# Patient Record
Sex: FEMALE | Race: WHITE | NOT HISPANIC OR LATINO | Employment: OTHER | URBAN - METROPOLITAN AREA
[De-identification: names, ages, dates, MRNs, and addresses within clinical notes are randomized per-mention and may not be internally consistent; named-entity substitution may affect disease eponyms.]

---

## 2017-08-08 PROBLEM — M48.07 LUMBOSACRAL STENOSIS: Status: ACTIVE | Noted: 2017-08-08

## 2017-08-08 PROBLEM — M54.16 LUMBAR RADICULOPATHY: Status: ACTIVE | Noted: 2017-08-08

## 2017-08-08 PROBLEM — G57.00 PIRIFORMIS SYNDROME: Status: ACTIVE | Noted: 2017-08-08

## 2017-10-03 PROBLEM — M48.062 NEUROGENIC CLAUDICATION DUE TO LUMBAR SPINAL STENOSIS: Status: ACTIVE | Noted: 2017-10-03

## 2017-10-05 PROBLEM — M43.17 ACQUIRED SPONDYLOLISTHESIS OF LUMBOSACRAL REGION: Status: ACTIVE | Noted: 2017-10-05

## 2017-10-05 PROBLEM — M51.26 HNP (HERNIATED NUCLEUS PULPOSUS), LUMBAR: Status: ACTIVE | Noted: 2017-10-05

## 2018-01-04 PROBLEM — I80.01 THROMBOPHLEBITIS OF SUPERFICIAL VEINS OF RIGHT LOWER EXTREMITY: Status: ACTIVE | Noted: 2018-01-04

## 2023-05-10 ENCOUNTER — OFFICE VISIT (OUTPATIENT)
Dept: PRIMARY CARE | Facility: CLINIC | Age: 83
End: 2023-05-10
Payer: MEDICARE

## 2023-05-10 VITALS
RESPIRATION RATE: 18 BRPM | DIASTOLIC BLOOD PRESSURE: 68 MMHG | WEIGHT: 131 LBS | SYSTOLIC BLOOD PRESSURE: 127 MMHG | HEART RATE: 75 BPM | BODY MASS INDEX: 25.58 KG/M2 | TEMPERATURE: 98 F | OXYGEN SATURATION: 98 %

## 2023-05-10 DIAGNOSIS — Z78.0 ASYMPTOMATIC MENOPAUSAL STATE: ICD-10-CM

## 2023-05-10 DIAGNOSIS — M81.0 OSTEOPOROSIS WITHOUT CURRENT PATHOLOGICAL FRACTURE, UNSPECIFIED OSTEOPOROSIS TYPE: ICD-10-CM

## 2023-05-10 DIAGNOSIS — M48.07 LUMBOSACRAL STENOSIS: ICD-10-CM

## 2023-05-10 DIAGNOSIS — G25.0 ESSENTIAL TREMOR: ICD-10-CM

## 2023-05-10 DIAGNOSIS — M43.17 ACQUIRED SPONDYLOLISTHESIS OF LUMBOSACRAL REGION: ICD-10-CM

## 2023-05-10 DIAGNOSIS — Z23 NEED FOR VACCINATION: ICD-10-CM

## 2023-05-10 DIAGNOSIS — E78.2 MODERATE MIXED HYPERLIPIDEMIA NOT REQUIRING STATIN THERAPY: ICD-10-CM

## 2023-05-10 DIAGNOSIS — M54.16 LUMBAR RADICULOPATHY: ICD-10-CM

## 2023-05-10 DIAGNOSIS — M48.062 NEUROGENIC CLAUDICATION DUE TO LUMBAR SPINAL STENOSIS: ICD-10-CM

## 2023-05-10 DIAGNOSIS — Z86.718 H/O DEEP VENOUS THROMBOSIS: ICD-10-CM

## 2023-05-10 DIAGNOSIS — Z00.00 ROUTINE GENERAL MEDICAL EXAMINATION AT HEALTH CARE FACILITY: Primary | ICD-10-CM

## 2023-05-10 DIAGNOSIS — Z13.1 DIABETES MELLITUS SCREENING: ICD-10-CM

## 2023-05-10 DIAGNOSIS — R73.09 ELEVATED RANDOM BLOOD GLUCOSE LEVEL: ICD-10-CM

## 2023-05-10 DIAGNOSIS — K57.90 DIVERTICULOSIS: ICD-10-CM

## 2023-05-10 DIAGNOSIS — K21.9 GASTROESOPHAGEAL REFLUX DISEASE WITHOUT ESOPHAGITIS: ICD-10-CM

## 2023-05-10 DIAGNOSIS — Z00.00 MEDICARE ANNUAL WELLNESS VISIT, SUBSEQUENT: ICD-10-CM

## 2023-05-10 PROBLEM — M51.26 HNP (HERNIATED NUCLEUS PULPOSUS), LUMBAR: Status: ACTIVE | Noted: 2017-10-05

## 2023-05-10 PROBLEM — H26.9 CATARACTS, BOTH EYES: Status: ACTIVE | Noted: 2023-05-10

## 2023-05-10 PROBLEM — M41.9 SCOLIOSIS: Status: ACTIVE | Noted: 2023-05-10

## 2023-05-10 PROBLEM — M19.90 OA (OSTEOARTHRITIS): Status: ACTIVE | Noted: 2023-05-10

## 2023-05-10 PROBLEM — I80.01 THROMBOPHLEBITIS OF SUPERFICIAL VEINS OF RIGHT LOWER EXTREMITY: Status: RESOLVED | Noted: 2018-01-04 | Resolved: 2023-05-10

## 2023-05-10 PROBLEM — E78.5 HYPERLIPIDEMIA: Status: ACTIVE | Noted: 2023-05-10

## 2023-05-10 PROBLEM — G57.00 PIRIFORMIS SYNDROME: Status: ACTIVE | Noted: 2017-08-08

## 2023-05-10 PROBLEM — I80.9 THROMBOPHLEBITIS: Status: ACTIVE | Noted: 2023-05-10

## 2023-05-10 PROBLEM — R33.9 URINARY RETENTION: Status: ACTIVE | Noted: 2023-05-10

## 2023-05-10 PROCEDURE — 90677 PCV20 VACCINE IM: CPT | Performed by: NURSE PRACTITIONER

## 2023-05-10 PROCEDURE — 1160F RVW MEDS BY RX/DR IN RCRD: CPT | Performed by: NURSE PRACTITIONER

## 2023-05-10 PROCEDURE — 1157F ADVNC CARE PLAN IN RCRD: CPT | Performed by: NURSE PRACTITIONER

## 2023-05-10 PROCEDURE — G0009 ADMIN PNEUMOCOCCAL VACCINE: HCPCS | Performed by: NURSE PRACTITIONER

## 2023-05-10 PROCEDURE — G0438 PPPS, INITIAL VISIT: HCPCS | Performed by: NURSE PRACTITIONER

## 2023-05-10 PROCEDURE — 1036F TOBACCO NON-USER: CPT | Performed by: NURSE PRACTITIONER

## 2023-05-10 PROCEDURE — 1159F MED LIST DOCD IN RCRD: CPT | Performed by: NURSE PRACTITIONER

## 2023-05-10 PROCEDURE — 1170F FXNL STATUS ASSESSED: CPT | Performed by: NURSE PRACTITIONER

## 2023-05-10 RX ORDER — MULTIVIT-MIN/FA/LYCOPEN/LUTEIN .4-300-25
TABLET ORAL
COMMUNITY
Start: 2016-09-08

## 2023-05-10 RX ORDER — APIXABAN 5 MG/1
5 TABLET, FILM COATED ORAL 2 TIMES DAILY
COMMUNITY
End: 2023-08-28 | Stop reason: SDUPTHER

## 2023-05-10 ASSESSMENT — PATIENT HEALTH QUESTIONNAIRE - PHQ9
SUM OF ALL RESPONSES TO PHQ9 QUESTIONS 1 AND 2: 0
1. LITTLE INTEREST OR PLEASURE IN DOING THINGS: NOT AT ALL
2. FEELING DOWN, DEPRESSED OR HOPELESS: NOT AT ALL

## 2023-05-10 ASSESSMENT — ENCOUNTER SYMPTOMS
LOSS OF SENSATION IN FEET: 0
ABDOMINAL PAIN: 0
WEAKNESS: 0
SINUS PRESSURE: 0
COUGH: 0
PALPITATIONS: 0
OCCASIONAL FEELINGS OF UNSTEADINESS: 0
BACK PAIN: 1
HEADACHES: 0
FEVER: 0
DIARRHEA: 0
TROUBLE SWALLOWING: 0
FATIGUE: 0
CONSTIPATION: 0
TREMORS: 1
SHORTNESS OF BREATH: 0
DEPRESSION: 0

## 2023-05-10 ASSESSMENT — ACTIVITIES OF DAILY LIVING (ADL)
GROCERY_SHOPPING: INDEPENDENT
DRESSING: INDEPENDENT
BATHING: INDEPENDENT
TAKING_MEDICATION: INDEPENDENT
DOING_HOUSEWORK: INDEPENDENT
MANAGING_FINANCES: INDEPENDENT

## 2023-05-10 NOTE — PATIENT INSTRUCTIONS
Please schedule your bone density (DEXA scan).   Your lab work should be drawn on an empty stomach.  I will notify you once results of the above testing are received.  You no longer require colonoscopy screening.  You will be due for your next mammogram after 9/28/2024.  Please follow-up with your dentist at least every 6-12 months.  Please continue annual eye exams with your ophthalmologist.  Please return at least once every year for your Medicare Annual Wellness exam and as needed.

## 2023-05-10 NOTE — PROGRESS NOTES
Subjective   Reason for Visit: Amber Carrington is an 82 y.o. female here for a Medicare Wellness visit.          Reviewed all medications by prescribing practitioner or clinical pharmacist (such as prescriptions, OTCs, herbal therapies and supplements) and documented in the medical record.    HPI    Patient Care Team:  MACY Elam as PCP - General     Review of Systems    Objective   Vitals:  /68   Pulse 75   Temp 36.7 °C (98 °F)   Resp 18   Wt 59.4 kg (131 lb)   LMP  (LMP Unknown)   SpO2 98%   BMI 25.58 kg/m²       Physical Exam    Assessment/Plan   Problem List Items Addressed This Visit        Nervous    Essential tremor    Lumbar radiculopathy    Lumbosacral stenosis       Digestive    Diverticulosis    GERD (gastroesophageal reflux disease)       Musculoskeletal    Acquired spondylolisthesis of lumbosacral region    Neurogenic claudication due to lumbar spinal stenosis       Other    Hyperlipidemia   Other Visit Diagnoses     Routine general medical examination at health care facility    -  Primary    Medicare annual wellness visit, subsequent        Relevant Orders    CBC    Thyroid Stimulating Hormone    Lipid Panel    Hemoglobin A1C    H/O deep venous thrombosis        This was unprovoked in 1/2023. Recommend lifelong anticoagulation. Continue Eliquis.    Relevant Orders    CBC    Elevated random blood glucose level        Relevant Orders    Hemoglobin A1C    Diabetes mellitus screening        Relevant Orders    Hemoglobin A1C    Comprehensive Metabolic Panel    Need for vaccination        Relevant Orders    Pneumococcal conjugate vaccine 20-valent IM    Asymptomatic menopausal state        Relevant Orders    XR DEXA bone density

## 2023-05-10 NOTE — PROGRESS NOTES
Subjective   Reason for Visit: Amber Carrington is an 82 y.o. female here for a Medicare Wellness visit.          Reviewed all medications by prescribing practitioner or clinical pharmacist (such as prescriptions, OTCs, herbal therapies and supplements) and documented in the medical record.    HPI    Patient Self Assessment of Health Status  Patient Self Assessment: Good    Nutrition and Exercise  Current Diet: Well Balanced Diet  Adequate Fluid Intake: Yes  Caffeine: Yes  Exercise Frequency: Regularly         Home Safety Risk Factors: None    Patient Care Team:  BABATUNDE Elam-CNP as PCP - General     Review of Systems    Objective   Vitals:  /68   Pulse 75   Temp 36.7 °C (98 °F)   Resp 18   Wt 59.4 kg (131 lb)   LMP  (LMP Unknown)   SpO2 98%   BMI 25.58 kg/m²       Physical Exam    Assessment/Plan   Problem List Items Addressed This Visit    None

## 2023-05-10 NOTE — PROGRESS NOTES
Subjective   Reason for Visit: Amber Carrington is an 82 y.o. female here for a Medicare Wellness visit. The patient states she has never had a complete physical, and typically only sees a doctor if she is sick. She does have a h/o unprovoked LLE DVT 1/2023 and is on Eliquis. She also has h/o lumbar back surgery. She has no new complaints today.      HPI     Patient Self Assessment of Health Status  Patient Self Assessment: Good     Nutrition and Exercise  Current Diet: Well Balanced Diet  Adequate Fluid Intake: Yes  Caffeine: Yes  Exercise Frequency: Regularly        Home Safety Risk Factors: None  Reviewed all medications by prescribing practitioner or clinical pharmacist (such as prescriptions, OTCs, herbal therapies and supplements) and documented in the medical record.    Patient Care Team:  BABATUNDE Elam-CNP as PCP - General     Review of Systems   Constitutional:  Negative for fatigue and fever.   HENT:  Positive for dental problem. Negative for sinus pressure and trouble swallowing.    Eyes:  Negative for visual disturbance.   Respiratory:  Negative for cough and shortness of breath.    Cardiovascular:  Negative for chest pain and palpitations.   Gastrointestinal:  Negative for abdominal pain, constipation and diarrhea.   Musculoskeletal:  Positive for back pain.   Neurological:  Positive for tremors. Negative for weakness and headaches.       Objective   Vitals:  /68   Pulse 75   Temp 36.7 °C (98 °F)   Resp 18   Wt 59.4 kg (131 lb)   LMP  (LMP Unknown)   SpO2 98%   BMI 25.58 kg/m²       Physical Exam  Vitals reviewed.   Constitutional:       Appearance: Normal appearance.   HENT:      Head: Normocephalic.      Comments: Missing 2 teeth lower left. No discomfort or signs of infection.   Eyes:      Conjunctiva/sclera: Conjunctivae normal.   Cardiovascular:      Rate and Rhythm: Normal rate and regular rhythm.      Pulses: Normal pulses.   Pulmonary:      Effort: Pulmonary effort is normal.       Breath sounds: Normal breath sounds.   Abdominal:      General: Bowel sounds are normal.      Palpations: Abdomen is soft.   Musculoskeletal:      Cervical back: Neck supple.   Skin:     General: Skin is warm and dry.   Neurological:      General: No focal deficit present.      Mental Status: She is alert and oriented to person, place, and time.      Cranial Nerves: Cranial nerves 2-12 are intact.      Motor: Tremor present.      Gait: Gait is intact.      Comments: Tremors noted in head/neck, staggered speach   Psychiatric:         Mood and Affect: Mood normal.         Thought Content: Thought content normal.     Assessment/Plan   Problem List Items Addressed This Visit          Nervous    Essential tremor    Lumbar radiculopathy    Lumbosacral stenosis       Digestive    Diverticulosis    GERD (gastroesophageal reflux disease)       Musculoskeletal    Acquired spondylolisthesis of lumbosacral region    Neurogenic claudication due to lumbar spinal stenosis       Other    Hyperlipidemia     Other Visit Diagnoses       Routine general medical examination at health care facility    -  Primary    Medicare annual wellness visit, subsequent        Relevant Orders    CBC    Thyroid Stimulating Hormone    Lipid Panel    Hemoglobin A1C    H/O deep venous thrombosis        This was unprovoked in 1/2023. Recommend lifelong anticoagulation. Continue Eliquis.    Relevant Orders    CBC    Elevated random blood glucose level        Relevant Orders    Hemoglobin A1C    Diabetes mellitus screening        Relevant Orders    Hemoglobin A1C    Comprehensive Metabolic Panel    Need for vaccination        Relevant Orders    Pneumococcal conjugate vaccine 20-valent IM    Asymptomatic menopausal state        Relevant Orders    XR DEXA bone density          Please schedule your bone density (DEXA scan).   Your lab work should be drawn on an empty stomach.  I will notify you once results of the above testing are received.  You no  longer require colonoscopy screening.  You will be due for your next mammogram after 9/28/2024.  Please follow-up for annual flu vaccine next fall.   Please follow-up with your dentist at least every 6-12 months.  Please continue annual eye exams with your ophthalmologist.  Please return at least once every year for your Medicare Annual Wellness exam and as needed.

## 2023-05-12 ENCOUNTER — TELEPHONE (OUTPATIENT)
Dept: PRIMARY CARE | Facility: CLINIC | Age: 83
End: 2023-05-12

## 2023-05-12 ENCOUNTER — LAB (OUTPATIENT)
Dept: LAB | Facility: LAB | Age: 83
End: 2023-05-12
Payer: MEDICARE

## 2023-05-12 DIAGNOSIS — R73.09 ELEVATED RANDOM BLOOD GLUCOSE LEVEL: ICD-10-CM

## 2023-05-12 DIAGNOSIS — Z00.00 MEDICARE ANNUAL WELLNESS VISIT, SUBSEQUENT: ICD-10-CM

## 2023-05-12 DIAGNOSIS — Z86.718 H/O DEEP VENOUS THROMBOSIS: ICD-10-CM

## 2023-05-12 DIAGNOSIS — Z13.1 DIABETES MELLITUS SCREENING: ICD-10-CM

## 2023-05-12 LAB
ALANINE AMINOTRANSFERASE (SGPT) (U/L) IN SER/PLAS: 12 U/L (ref 7–45)
ALBUMIN (G/DL) IN SER/PLAS: 4.2 G/DL (ref 3.4–5)
ALKALINE PHOSPHATASE (U/L) IN SER/PLAS: 61 U/L (ref 33–136)
ANION GAP IN SER/PLAS: 11 MMOL/L (ref 10–20)
ASPARTATE AMINOTRANSFERASE (SGOT) (U/L) IN SER/PLAS: 14 U/L (ref 9–39)
BILIRUBIN TOTAL (MG/DL) IN SER/PLAS: 0.7 MG/DL (ref 0–1.2)
CALCIUM (MG/DL) IN SER/PLAS: 9.6 MG/DL (ref 8.6–10.3)
CARBON DIOXIDE, TOTAL (MMOL/L) IN SER/PLAS: 28 MMOL/L (ref 21–32)
CHLORIDE (MMOL/L) IN SER/PLAS: 107 MMOL/L (ref 98–107)
CHOLESTEROL (MG/DL) IN SER/PLAS: 202 MG/DL (ref 0–199)
CHOLESTEROL IN HDL (MG/DL) IN SER/PLAS: 49.5 MG/DL
CHOLESTEROL/HDL RATIO: 4.1
CREATININE (MG/DL) IN SER/PLAS: 0.82 MG/DL (ref 0.5–1.05)
ERYTHROCYTE DISTRIBUTION WIDTH (RATIO) BY AUTOMATED COUNT: 13.8 % (ref 11.5–14.5)
ERYTHROCYTE MEAN CORPUSCULAR HEMOGLOBIN CONCENTRATION (G/DL) BY AUTOMATED: 32.5 G/DL (ref 32–36)
ERYTHROCYTE MEAN CORPUSCULAR VOLUME (FL) BY AUTOMATED COUNT: 91 FL (ref 80–100)
ERYTHROCYTES (10*6/UL) IN BLOOD BY AUTOMATED COUNT: 4.96 X10E12/L (ref 4–5.2)
ESTIMATED AVERAGE GLUCOSE FOR HBA1C: 111 MG/DL
GFR FEMALE: 70 ML/MIN/1.73M2
GLUCOSE (MG/DL) IN SER/PLAS: 84 MG/DL (ref 74–99)
HEMATOCRIT (%) IN BLOOD BY AUTOMATED COUNT: 45.3 % (ref 36–46)
HEMOGLOBIN (G/DL) IN BLOOD: 14.7 G/DL (ref 12–16)
HEMOGLOBIN A1C/HEMOGLOBIN TOTAL IN BLOOD: 5.5 %
LDL: 126 MG/DL (ref 0–99)
LEUKOCYTES (10*3/UL) IN BLOOD BY AUTOMATED COUNT: 6.3 X10E9/L (ref 4.4–11.3)
PLATELETS (10*3/UL) IN BLOOD AUTOMATED COUNT: 244 X10E9/L (ref 150–450)
POTASSIUM (MMOL/L) IN SER/PLAS: 4.4 MMOL/L (ref 3.5–5.3)
PROTEIN TOTAL: 6.7 G/DL (ref 6.4–8.2)
SODIUM (MMOL/L) IN SER/PLAS: 142 MMOL/L (ref 136–145)
THYROTROPIN (MIU/L) IN SER/PLAS BY DETECTION LIMIT <= 0.05 MIU/L: 2.33 MIU/L (ref 0.44–3.98)
TRIGLYCERIDE (MG/DL) IN SER/PLAS: 134 MG/DL (ref 0–149)
UREA NITROGEN (MG/DL) IN SER/PLAS: 15 MG/DL (ref 6–23)
VLDL: 27 MG/DL (ref 0–40)

## 2023-05-12 PROCEDURE — 36415 COLL VENOUS BLD VENIPUNCTURE: CPT

## 2023-05-12 PROCEDURE — 80053 COMPREHEN METABOLIC PANEL: CPT

## 2023-05-12 PROCEDURE — 85027 COMPLETE CBC AUTOMATED: CPT

## 2023-05-12 PROCEDURE — 80061 LIPID PANEL: CPT

## 2023-05-12 PROCEDURE — 83036 HEMOGLOBIN GLYCOSYLATED A1C: CPT

## 2023-05-12 PROCEDURE — 84443 ASSAY THYROID STIM HORMONE: CPT

## 2023-05-12 NOTE — RESULT ENCOUNTER NOTE
Please advise patient her labs were normal except for mildly elevated cholesterol. She does not need medication at this time, but should increase consumption of fruits and vegetables and decrease intake of fatty foods fast foods.

## 2023-05-12 NOTE — TELEPHONE ENCOUNTER
----- Message from MACY Elam sent at 5/12/2023  2:39 PM EDT -----  Please advise patient her labs were normal except for mildly elevated cholesterol. She does not need medication at this time, but should increase consumption of fruits and vegetables and decrease intake of fatty foods fast foods.

## 2023-06-01 ENCOUNTER — TELEPHONE (OUTPATIENT)
Dept: PRIMARY CARE | Facility: CLINIC | Age: 83
End: 2023-06-01
Payer: MEDICARE

## 2023-06-01 NOTE — TELEPHONE ENCOUNTER
----- Message from MACY Elam sent at 5/31/2023  3:10 PM EDT -----  Please advise patient that her bone density scan shows osteoporosis. This increases her risk for fractures both with and without a fall or other traumatic injury. I would like her to have a vitamin D level drawn. Once that is done, she will need to follow-up with me to discuss treatment options for her osteoporosis.

## 2023-08-02 NOTE — PROGRESS NOTES
Patient Name: Gunnar Lazcano : 1940        Date: 2023      Type of Appt: Consult    Reason for appt: VERBAL REFERRAL FROM DR. RIGGINS PER PT. LOW BACK PAIN. LUMBAR XRAY DONE AT [facility]. PT HAS DISC. Referred by: VERBAL REFERRAL FROM DR. RIGGINS PER PT    Pt last seen by Dr. Morteza Dumont on: 2018    Studies done: Xray L/S @ [facility]  -lmovm for pt to bring disc and report to appt. Surgeries: [facility] 2017 left L4-5 microdissection decompression interbody cage posterior lateral fusion pedicle screws with Dr. Darrick [facility]  Neurosurgery and Pain [facility]  [address], [address] [place] [address], [address] [place] [address] [place]: [phone]  F: [phone]          Patient:  Gunnar Lazcano  YOB: 1940  Date: 2023    The patient is a 78 y.o. female who presents today for evaluation of the following problems:     Chief Complaint   Patient presents with    Back Pain        Referred by No ref. provider found      PAST MEDICAL, FAMILY AND SOCIAL HISTORY:  Past Medical History:   Diagnosis Date    Allergic rhinitis     Diverticulosis 2009    Gastritis 2009    GERD (gastroesophageal reflux disease)     HNP (herniated nucleus pulposus)     L SPINE    Hyperlipidemia     OA (osteoarthritis)     Scoliosis      Past Surgical History:   Procedure Laterality Date    BREAST BIOPSY  2015    Mike TRAN M.D. CATARACT REMOVAL  2010    BOTH    COLONOSCOPY  09    DR BRUNER    FOOT SURGERY      HYSTERECTOMY (CERVIX STATUS UNKNOWN)  1979    LUMBAR [address] [facility] SURGERY  2008    DR. Mix    UPPER GASTROINTESTINAL ENDOSCOPY  09,2013    DR BRUNER    UPPER GASTROINTESTINAL ENDOSCOPY  2014     No family history on file.   Current Outpatient Medications on File Prior to Visit   Medication Sig Dispense Refill    Handicap Placard MISC by Does not apply route Six months 1 each 0    gabapentin (NEURONTIN) 100 MG

## 2023-08-08 ENCOUNTER — INITIAL CONSULT (OUTPATIENT)
Dept: NEUROSURGERY | Age: 83
End: 2023-08-08
Payer: MEDICARE

## 2023-08-08 VITALS
WEIGHT: 126.7 LBS | SYSTOLIC BLOOD PRESSURE: 116 MMHG | TEMPERATURE: 97.3 F | DIASTOLIC BLOOD PRESSURE: 62 MMHG | HEIGHT: 61 IN | BODY MASS INDEX: 23.92 KG/M2

## 2023-08-08 DIAGNOSIS — Z98.1 HISTORY OF LUMBAR FUSION: ICD-10-CM

## 2023-08-08 DIAGNOSIS — I70.219 ATHEROSCLEROTIC PERIPHERAL VASCULAR DISEASE WITH INTERMITTENT CLAUDICATION (HCC): ICD-10-CM

## 2023-08-08 DIAGNOSIS — I73.9 PERIPHERAL VASCULAR DISEASE, UNSPECIFIED (HCC): ICD-10-CM

## 2023-08-08 DIAGNOSIS — M48.062 LUMBAR STENOSIS WITH NEUROGENIC CLAUDICATION: Primary | ICD-10-CM

## 2023-08-08 PROCEDURE — G8427 DOCREV CUR MEDS BY ELIG CLIN: HCPCS | Performed by: NEUROLOGICAL SURGERY

## 2023-08-08 PROCEDURE — 99203 OFFICE O/P NEW LOW 30 MIN: CPT | Performed by: NEUROLOGICAL SURGERY

## 2023-08-08 PROCEDURE — G8420 CALC BMI NORM PARAMETERS: HCPCS | Performed by: NEUROLOGICAL SURGERY

## 2023-08-08 PROCEDURE — 93925 LOWER EXTREMITY STUDY: CPT | Performed by: NEUROLOGICAL SURGERY

## 2023-08-08 PROCEDURE — 1090F PRES/ABSN URINE INCON ASSESS: CPT | Performed by: NEUROLOGICAL SURGERY

## 2023-08-08 ASSESSMENT — ENCOUNTER SYMPTOMS
SHORTNESS OF BREATH: 0
NAUSEA: 0
EYE PAIN: 0
BACK PAIN: 1

## 2023-08-09 ENCOUNTER — TELEPHONE (OUTPATIENT)
Dept: NEUROSURGERY | Age: 83
End: 2023-08-09

## 2023-08-09 NOTE — TELEPHONE ENCOUNTER
DE Duplex Lo Extrem Art Bilron order faxed to [facility] ([phone], [phone]). They will call patient to schedule.     No precert required-Medicare

## 2023-08-28 ENCOUNTER — LAB (OUTPATIENT)
Dept: LAB | Facility: LAB | Age: 83
End: 2023-08-28
Payer: MEDICARE

## 2023-08-28 ENCOUNTER — TELEPHONE (OUTPATIENT)
Dept: PEDIATRICS | Facility: CLINIC | Age: 83
End: 2023-08-28

## 2023-08-28 DIAGNOSIS — Z86.718 H/O DEEP VENOUS THROMBOSIS: ICD-10-CM

## 2023-08-28 DIAGNOSIS — M81.0 OSTEOPOROSIS WITHOUT CURRENT PATHOLOGICAL FRACTURE, UNSPECIFIED OSTEOPOROSIS TYPE: ICD-10-CM

## 2023-08-28 LAB — CALCIDIOL (25 OH VITAMIN D3) (NG/ML) IN SER/PLAS: 47 NG/ML

## 2023-08-28 PROCEDURE — 82306 VITAMIN D 25 HYDROXY: CPT

## 2023-08-28 PROCEDURE — 36415 COLL VENOUS BLD VENIPUNCTURE: CPT

## 2023-08-28 RX ORDER — APIXABAN 5 MG/1
5 TABLET, FILM COATED ORAL 2 TIMES DAILY
Qty: 180 TABLET | Refills: 1 | Status: SHIPPED | OUTPATIENT
Start: 2023-08-28 | End: 2023-08-28 | Stop reason: SDUPTHER

## 2023-08-28 NOTE — TELEPHONE ENCOUNTER
Patient requests prescription below    Last Office Visit: 5/10/2023     Requested Prescriptions     Pending Prescriptions Disp Refills    Eliquis 5 mg tablet 180 tablet 1     Sig: Take 1 tablet (5 mg) by mouth 2 times a day.

## 2023-08-29 NOTE — PROGRESS NOTES
Patient Name: Juliana Ahmadi : 1940        Date: 2023      Type of Appt: Follow up     Reason for appt: Review Doppler ultrasound lower extremity for vascular supply x-ray MRI lumbar spine(@ [facility] recheck 30    Pt last seen by Dr. Tiara Mckeon on: 23    Studies done: Ultrasound of Lower Ext.  @ [facility]. Per pt   23- Recinos Dusoll for pt to bring studies on disc to appt    Surgeries: [facility] 2017 left L4-5 microdissection decompression interbody cage posterior lateral fusion pedicle screws with Dr. Tiara Mckeon     Smoking: No- Former     Review of Systems: All negatives     Chief Complaint   Patient presents with    Back Pain         Referred by No ref. provider found        PAST MEDICAL, FAMILY AND SOCIAL HISTORY:  Past Medical History        Past Medical History:   Diagnosis Date    Allergic rhinitis      Diverticulosis     Gastritis     GERD (gastroesophageal reflux disease)      HNP (herniated nucleus pulposus)       L SPINE    Hyperlipidemia      OA (osteoarthritis)      Scoliosis           Past Surgical History         Past Surgical History:   Procedure Laterality Date    BREAST BIOPSY   2015     Ying TRAN M.D. CATARACT REMOVAL        BOTH    COLONOSCOPY   09     DR BRUNER    FOOT SURGERY       HYSTERECTOMY (CERVIX STATUS UNKNOWN)   1979    LUMBAR [address] [facility] SURGERY   2008      [facility]    UPPER GASTROINTESTINAL ENDOSCOPY   09,2013     DR BRUNER    UPPER GASTROINTESTINAL ENDOSCOPY   2014         Family History   No family history on file.             Current Outpatient Medications on File Prior to Visit   Medication Sig Dispense Refill    Handicap Placard MISC by Does not apply route Six months 1 each 0    gabapentin (NEURONTIN) 100 MG capsule Take 1 capsule by mouth every evening 30 capsule 0    omeprazole (PRILOSEC) 40 MG delayed release capsule TK ONE C PO  D   13    oxyCODONE-acetaminophen (PERCOCET) 5-325 MG per tablet TK 1 T PO Q 4 H AS

## 2023-09-05 ENCOUNTER — OFFICE VISIT (OUTPATIENT)
Dept: NEUROSURGERY | Age: 83
End: 2023-09-05
Payer: MEDICARE

## 2023-09-05 VITALS — HEIGHT: 61 IN | WEIGHT: 126 LBS | BODY MASS INDEX: 23.79 KG/M2

## 2023-09-05 DIAGNOSIS — Z98.1 HISTORY OF LUMBAR FUSION: ICD-10-CM

## 2023-09-05 DIAGNOSIS — M76.72 PERONEAL TENDINITIS OF LEFT LOWER LEG: ICD-10-CM

## 2023-09-05 DIAGNOSIS — M48.062 LUMBAR STENOSIS WITH NEUROGENIC CLAUDICATION: Primary | ICD-10-CM

## 2023-09-05 DIAGNOSIS — M47.816 LUMBAR SPONDYLOSIS: ICD-10-CM

## 2023-09-05 PROCEDURE — G8400 PT W/DXA NO RESULTS DOC: HCPCS | Performed by: NEUROLOGICAL SURGERY

## 2023-09-05 PROCEDURE — G8428 CUR MEDS NOT DOCUMENT: HCPCS | Performed by: NEUROLOGICAL SURGERY

## 2023-09-05 PROCEDURE — 1090F PRES/ABSN URINE INCON ASSESS: CPT | Performed by: NEUROLOGICAL SURGERY

## 2023-09-05 PROCEDURE — G8420 CALC BMI NORM PARAMETERS: HCPCS | Performed by: NEUROLOGICAL SURGERY

## 2023-09-05 PROCEDURE — 99213 OFFICE O/P EST LOW 20 MIN: CPT | Performed by: NEUROLOGICAL SURGERY

## 2023-09-05 PROCEDURE — 1036F TOBACCO NON-USER: CPT | Performed by: NEUROLOGICAL SURGERY

## 2023-09-05 PROCEDURE — 1123F ACP DISCUSS/DSCN MKR DOCD: CPT | Performed by: NEUROLOGICAL SURGERY

## 2023-09-07 NOTE — PROGRESS NOTES
Patient Name: Gunnar Lazcano : 1940        Date: 2023      Type of Appt: Follow up    Reason for appt: Return for after studies completed, 30 min. [facility] 9/10)    Pt last seen by Dr. Morteza Dumont on: 23    Studies done: 23- MRI L/S @ [facility]  23- Xray L/S @ [facility] per pt ? ? Report? ?    23- pt stated that she had both MRI & Xray done at [facility] FOR JOINT DISEASES. I called [facility] to have images pushed to [facility]. @ 4:20pm    Surgeries:  [facility] 2017 left L4-5 microdissection decompression interbody cage posterior lateral fusion pedicle screws with Dr. Morteza Dumont     Smoking: No- Former    REVIEW OF SYSTEMS: All Negatives      Chief Complaint   Patient presents with    Back Pain         Referred by No ref. provider found        PAST MEDICAL, FAMILY AND SOCIAL HISTORY:  Past Medical History           Past Medical History:   Diagnosis Date    Allergic rhinitis      Diverticulosis 2009    Gastritis 2009    GERD (gastroesophageal reflux disease)      HNP (herniated nucleus pulposus)       L SPINE    Hyperlipidemia      OA (osteoarthritis)      Scoliosis           Past Surgical History             Past Surgical History:   Procedure Laterality Date    BREAST BIOPSY   2015     Mike TRAN M.D. CATARACT REMOVAL   2010     BOTH    COLONOSCOPY   09     DR BRUNER    FOOT SURGERY       HYSTERECTOMY (CERVIX STATUS UNKNOWN)   1979    LUMBAR [facility] SURGERY   2008      [facility]    UPPER GASTROINTESTINAL ENDOSCOPY   09,2013     DR BRUNER    UPPER GASTROINTESTINAL ENDOSCOPY   2014         Family History   No family history on file.                   Current Outpatient Medications on File Prior to Visit   Medication Sig Dispense Refill    Handicap Placard MISC by Does not apply route Six months 1 each 0    gabapentin (NEURONTIN) 100 MG capsule Take 1 capsule by mouth every evening 30 capsule 0    omeprazole (PRILOSEC) 40 MG delayed release capsule TK ONE C PO  D   13

## 2023-09-19 ENCOUNTER — OFFICE VISIT (OUTPATIENT)
Dept: NEUROSURGERY | Age: 83
End: 2023-09-19
Payer: MEDICARE

## 2023-09-19 ENCOUNTER — PREP FOR PROCEDURE (OUTPATIENT)
Dept: NEUROSURGERY | Age: 83
End: 2023-09-19

## 2023-09-19 VITALS
BODY MASS INDEX: 23.79 KG/M2 | WEIGHT: 126 LBS | TEMPERATURE: 97.3 F | DIASTOLIC BLOOD PRESSURE: 70 MMHG | SYSTOLIC BLOOD PRESSURE: 122 MMHG | HEIGHT: 61 IN

## 2023-09-19 DIAGNOSIS — M48.062 LUMBAR STENOSIS WITH NEUROGENIC CLAUDICATION: Primary | ICD-10-CM

## 2023-09-19 DIAGNOSIS — Z98.1 HISTORY OF LUMBAR FUSION: ICD-10-CM

## 2023-09-19 PROCEDURE — G8400 PT W/DXA NO RESULTS DOC: HCPCS | Performed by: NEUROLOGICAL SURGERY

## 2023-09-19 PROCEDURE — G8427 DOCREV CUR MEDS BY ELIG CLIN: HCPCS | Performed by: NEUROLOGICAL SURGERY

## 2023-09-19 PROCEDURE — 1123F ACP DISCUSS/DSCN MKR DOCD: CPT | Performed by: NEUROLOGICAL SURGERY

## 2023-09-19 PROCEDURE — 1036F TOBACCO NON-USER: CPT | Performed by: NEUROLOGICAL SURGERY

## 2023-09-19 PROCEDURE — G8420 CALC BMI NORM PARAMETERS: HCPCS | Performed by: NEUROLOGICAL SURGERY

## 2023-09-19 PROCEDURE — 99213 OFFICE O/P EST LOW 20 MIN: CPT | Performed by: NEUROLOGICAL SURGERY

## 2023-09-19 PROCEDURE — 1090F PRES/ABSN URINE INCON ASSESS: CPT | Performed by: NEUROLOGICAL SURGERY

## 2023-09-19 RX ORDER — SODIUM CHLORIDE 0.9 % (FLUSH) 0.9 %
5-40 SYRINGE (ML) INJECTION EVERY 12 HOURS SCHEDULED
Status: CANCELLED | OUTPATIENT
Start: 2023-09-29

## 2023-09-19 RX ORDER — ACETAMINOPHEN 500 MG
1000 TABLET ORAL ONCE
Status: CANCELLED | OUTPATIENT
Start: 2023-09-29 | End: 2023-09-19

## 2023-09-19 RX ORDER — DEXAMETHASONE SODIUM PHOSPHATE 4 MG/ML
8 INJECTION, SOLUTION INTRA-ARTICULAR; INTRALESIONAL; INTRAMUSCULAR; INTRAVENOUS; SOFT TISSUE ONCE
Status: CANCELLED | OUTPATIENT
Start: 2023-09-29 | End: 2023-09-19

## 2023-09-19 RX ORDER — KETOROLAC TROMETHAMINE 15 MG/ML
15 INJECTION, SOLUTION INTRAMUSCULAR; INTRAVENOUS ONCE
Status: CANCELLED | OUTPATIENT
Start: 2023-09-29 | End: 2023-09-19

## 2023-09-19 RX ORDER — SODIUM CHLORIDE 9 MG/ML
INJECTION, SOLUTION INTRAVENOUS PRN
Status: CANCELLED | OUTPATIENT
Start: 2023-09-29

## 2023-09-19 RX ORDER — SODIUM CHLORIDE 0.9 % (FLUSH) 0.9 %
5-40 SYRINGE (ML) INJECTION PRN
Status: CANCELLED | OUTPATIENT
Start: 2023-09-29

## 2023-09-29 ENCOUNTER — HOSPITAL ENCOUNTER (OUTPATIENT)
Dept: PREADMISSION TESTING | Age: 83
Discharge: HOME | End: 2023-10-03
Payer: MEDICARE

## 2023-09-29 VITALS
TEMPERATURE: 98.1 F | SYSTOLIC BLOOD PRESSURE: 140 MMHG | HEART RATE: 91 BPM | DIASTOLIC BLOOD PRESSURE: 67 MMHG | RESPIRATION RATE: 20 BRPM | OXYGEN SATURATION: 95 %

## 2023-09-29 PROBLEM — I80.9 THROMBOPHLEBITIS: Status: ACTIVE | Noted: 2023-05-10

## 2023-09-29 PROBLEM — G25.0 ESSENTIAL TREMOR: Status: ACTIVE | Noted: 2023-05-10

## 2023-09-29 LAB
ABO + RH BLD: NORMAL
APTT PPP: 34 SEC (ref 24.4–36.8)
BLD GP AB SCN SERPL QL: NORMAL
INR PPP: 1
PROTHROMBIN TIME: 13.5 SEC (ref 12.3–14.9)

## 2023-09-29 PROCEDURE — 85730 THROMBOPLASTIN TIME PARTIAL: CPT

## 2023-09-29 PROCEDURE — 86870 RBC ANTIBODY IDENTIFICATION: CPT

## 2023-09-29 PROCEDURE — 86850 RBC ANTIBODY SCREEN: CPT

## 2023-09-29 PROCEDURE — 87641 MR-STAPH DNA AMP PROBE: CPT

## 2023-09-29 PROCEDURE — 86900 BLOOD TYPING SEROLOGIC ABO: CPT

## 2023-09-29 PROCEDURE — 85610 PROTHROMBIN TIME: CPT

## 2023-09-29 PROCEDURE — 86901 BLOOD TYPING SEROLOGIC RH(D): CPT

## 2023-09-29 RX ORDER — APIXABAN 5 MG/1
5 TABLET, FILM COATED ORAL 2 TIMES DAILY
COMMUNITY
Start: 2023-08-28

## 2023-09-29 ASSESSMENT — ENCOUNTER SYMPTOMS
TROUBLE SWALLOWING: 0
FACIAL SWELLING: 0
EYE REDNESS: 0
CHOKING: 0
EYE DISCHARGE: 0
PHOTOPHOBIA: 0
SINUS PAIN: 0
SHORTNESS OF BREATH: 0
SINUS PRESSURE: 0
BACK PAIN: 1
SORE THROAT: 0
APNEA: 0
CHEST TIGHTNESS: 0
DIARRHEA: 0
NAUSEA: 0
RHINORRHEA: 0
EYE ITCHING: 0
VOMITING: 0
COUGH: 0
CONSTIPATION: 0
WHEEZING: 0
ABDOMINAL DISTENTION: 0
EYE PAIN: 0
ABDOMINAL PAIN: 0

## 2023-09-29 NOTE — H&P
PRE ADMISSION TESTING    HISTORY AND PHYSICAL EXAM    PATIENT NAME:  Haven Iniguez    YOB: 1940  MRN:  [id number]  SERVICE DATE:  9/29/2023     Primary Care Physician: Rodrigue Garland DO   Surgeon: Dr. Gamboa Current:  Lumbar stenosis with neurogenic claudication    The patient is a 78 y.o. female with significant past medical history of GERD, HLD, OA, diverticulosis, PVD w/intermittent claudication, h/o DVT (on Eliquis) who presents for a preoperative consultation at the request of surgeon, Dr. Gayla Dennis, who plans on performing Left bilateral Lumbar 3-4 minimally invasive microdecompression  on 10/4/23 at [facility] [place]. Patient has a hx of chronic back pain for multiple years and has had previous back surgeries performed by Dr. Shelby Sanon. Recently, her right leg started to become painful for no specific reason. She saw her orthopedic doctor who gave her an injection in her knee but it only provided minimal relief. At her follow up, her orthopedists reported that the issues could be from her back so he completed an x-ray and told her that she needed to see Dr. Shelby Sanon regarding her low back for possible surgical intervention. Dr. Shelby Sanon ordered u/s, x-rays and MRI. Dr. Shelby Sanon recommended surgical intervention d/t the severity of the stenosis which could cause her to lose ability to walk. Patient reports that all her pain is in her right knee mostly but can go low into her calf and is rated 2-3/10 on VAS. She will occasionally have some discomfort in her low back. Denies numbness or tingling. She has not found anything that will relieve the pain. She doesn't take any medications for the pain. Denies loss of bowel or bladder function.       Known Anesthesia Problems: Past general anesthesia with complications- last back surgery (4 hours surgery) she could not urinate or have a bowel movement for multiple days  Patient Objection to Receiving Blood Products: No  Personal of

## 2023-09-29 NOTE — H&P (VIEW-ONLY)
PRE ADMISSION TESTING    HISTORY AND PHYSICAL EXAM    PATIENT NAME:  Francisca Jones    YOB: 1940  MRN:  [id number]  SERVICE DATE:  9/29/2023     Primary Care Physician: Arnaldo Amaya DO   Surgeon: Dr. Ryley Nielsen:  Lumbar stenosis with neurogenic claudication    The patient is a 78 y.o. female with significant past medical history of GERD, HLD, OA, diverticulosis, PVD w/intermittent claudication, h/o DVT (on Eliquis) who presents for a preoperative consultation at the request of surgeon, Dr. Merari Orellana, who plans on performing Left bilateral Lumbar 3-4 minimally invasive microdecompression  on 10/4/23 at [facility] [place]. Patient has a hx of chronic back pain for multiple years and has had previous back surgeries performed by Dr. Adrien Buckley. Recently, her right leg started to become painful for no specific reason. She saw her orthopedic doctor who gave her an injection in her knee but it only provided minimal relief. At her follow up, her orthopedists reported that the issues could be from her back so he completed an x-ray and told her that she needed to see Dr. Adrien Buckley regarding her low back for possible surgical intervention. Dr. Adrien Buckley ordered u/s, x-rays and MRI. Dr. Adrien Buckley recommended surgical intervention d/t the severity of the stenosis which could cause her to lose ability to walk. Patient reports that all her pain is in her right knee mostly but can go low into her calf and is rated 2-3/10 on VAS. She will occasionally have some discomfort in her low back. Denies numbness or tingling. She has not found anything that will relieve the pain. She doesn't take any medications for the pain. Denies loss of bowel or bladder function.       Known Anesthesia Problems: Past general anesthesia with complications- last back surgery (4 hours surgery) she could not urinate or have a bowel movement for multiple days  Patient Objection to Receiving Blood Products: No  Personal of

## 2023-09-30 LAB
BLOOD GROUP ANTIBODIES SERPL: NORMAL
BLOOD GROUP ANTIBODIES SERPL: NORMAL
MRSA, DNA, NASAL: NEGATIVE
SPECIMEN DESCRIPTION: NORMAL

## 2023-10-03 ENCOUNTER — ANESTHESIA EVENT (OUTPATIENT)
Dept: OPERATING ROOM | Age: 83
End: 2023-10-03
Payer: MEDICARE

## 2023-10-03 LAB
BLOOD BANK DISPENSE STATUS: NORMAL
BLOOD BANK DISPENSE STATUS: NORMAL
BLOOD BANK PRODUCT CODE: NORMAL
BLOOD BANK PRODUCT CODE: NORMAL
BPU ID: NORMAL
BPU ID: NORMAL
DESCRIPTION BLOOD BANK: NORMAL
DESCRIPTION BLOOD BANK: NORMAL

## 2023-10-04 ENCOUNTER — HOSPITAL ENCOUNTER (INPATIENT)
Age: 83
LOS: 1 days | Discharge: HOME | DRG: 520 | End: 2023-10-06
Attending: NEUROLOGICAL SURGERY | Admitting: NEUROLOGICAL SURGERY
Payer: MEDICARE

## 2023-10-04 ENCOUNTER — ANESTHESIA (OUTPATIENT)
Dept: OPERATING ROOM | Age: 83
End: 2023-10-04
Payer: MEDICARE

## 2023-10-04 ENCOUNTER — APPOINTMENT (OUTPATIENT)
Dept: GENERAL RADIOLOGY | Age: 83
DRG: 520 | End: 2023-10-04
Attending: NEUROLOGICAL SURGERY
Payer: MEDICARE

## 2023-10-04 DIAGNOSIS — M48.062 NEUROGENIC CLAUDICATION DUE TO LUMBAR SPINAL STENOSIS: Primary | ICD-10-CM

## 2023-10-04 DIAGNOSIS — R52 PAIN: ICD-10-CM

## 2023-10-04 PROCEDURE — 01NB0ZZ RELEASE LUMBAR NERVE, OPEN APPROACH: ICD-10-PCS | Performed by: NEUROLOGICAL SURGERY

## 2023-10-04 PROCEDURE — 2500000003 HC RX 250 WO HCPCS: Performed by: NURSE ANESTHETIST, CERTIFIED REGISTERED

## 2023-10-04 PROCEDURE — 7100000001 HC PACU RECOVERY - ADDTL 15 MIN: Performed by: NEUROLOGICAL SURGERY

## 2023-10-04 PROCEDURE — 6360000002 HC RX W HCPCS: Performed by: NEUROLOGICAL SURGERY

## 2023-10-04 PROCEDURE — A4217 STERILE WATER/SALINE, 500 ML: HCPCS | Performed by: NEUROLOGICAL SURGERY

## 2023-10-04 PROCEDURE — 3700000000 HC ANESTHESIA ATTENDED CARE: Performed by: NEUROLOGICAL SURGERY

## 2023-10-04 PROCEDURE — 2580000003 HC RX 258: Performed by: NEUROLOGICAL SURGERY

## 2023-10-04 PROCEDURE — 6360000002 HC RX W HCPCS: Performed by: STUDENT IN AN ORGANIZED HEALTH CARE EDUCATION/TRAINING PROGRAM

## 2023-10-04 PROCEDURE — 2720000010 HC SURG SUPPLY STERILE: Performed by: NEUROLOGICAL SURGERY

## 2023-10-04 PROCEDURE — 6370000000 HC RX 637 (ALT 250 FOR IP): Performed by: NEUROLOGICAL SURGERY

## 2023-10-04 PROCEDURE — 6360000002 HC RX W HCPCS

## 2023-10-04 PROCEDURE — 2500000003 HC RX 250 WO HCPCS: Performed by: STUDENT IN AN ORGANIZED HEALTH CARE EDUCATION/TRAINING PROGRAM

## 2023-10-04 PROCEDURE — 3700000001 HC ADD 15 MINUTES (ANESTHESIA): Performed by: NEUROLOGICAL SURGERY

## 2023-10-04 PROCEDURE — G0378 HOSPITAL OBSERVATION PER HR: HCPCS

## 2023-10-04 PROCEDURE — 00NY0ZZ RELEASE LUMBAR SPINAL CORD, OPEN APPROACH: ICD-10-PCS | Performed by: NEUROLOGICAL SURGERY

## 2023-10-04 PROCEDURE — 2580000003 HC RX 258: Performed by: STUDENT IN AN ORGANIZED HEALTH CARE EDUCATION/TRAINING PROGRAM

## 2023-10-04 PROCEDURE — 3600000014 HC SURGERY LEVEL 4 ADDTL 15MIN: Performed by: NEUROLOGICAL SURGERY

## 2023-10-04 PROCEDURE — 7100000000 HC PACU RECOVERY - FIRST 15 MIN: Performed by: NEUROLOGICAL SURGERY

## 2023-10-04 PROCEDURE — 2709999900 HC NON-CHARGEABLE SUPPLY: Performed by: NEUROLOGICAL SURGERY

## 2023-10-04 PROCEDURE — 6360000002 HC RX W HCPCS: Performed by: NURSE ANESTHETIST, CERTIFIED REGISTERED

## 2023-10-04 PROCEDURE — 63047 LAM FACETEC & FORAMOT LUMBAR: CPT | Performed by: NEUROLOGICAL SURGERY

## 2023-10-04 PROCEDURE — 3600000004 HC SURGERY LEVEL 4 BASE: Performed by: NEUROLOGICAL SURGERY

## 2023-10-04 PROCEDURE — 2500000003 HC RX 250 WO HCPCS: Performed by: NEUROLOGICAL SURGERY

## 2023-10-04 PROCEDURE — C1713 ANCHOR/SCREW BN/BN,TIS/BN: HCPCS | Performed by: NEUROLOGICAL SURGERY

## 2023-10-04 RX ORDER — ONDANSETRON 2 MG/ML
4 INJECTION INTRAMUSCULAR; INTRAVENOUS EVERY 6 HOURS PRN
Status: DISCONTINUED | OUTPATIENT
Start: 2023-10-04 | End: 2023-10-06 | Stop reason: HOSPADM

## 2023-10-04 RX ORDER — ACETAMINOPHEN 325 MG/1
650 TABLET ORAL EVERY 6 HOURS
Status: DISCONTINUED | OUTPATIENT
Start: 2023-10-04 | End: 2023-10-06 | Stop reason: HOSPADM

## 2023-10-04 RX ORDER — SODIUM CHLORIDE 9 MG/ML
INJECTION, SOLUTION INTRAVENOUS PRN
Status: DISCONTINUED | OUTPATIENT
Start: 2023-10-04 | End: 2023-10-04 | Stop reason: HOSPADM

## 2023-10-04 RX ORDER — SODIUM CHLORIDE 9 MG/ML
INJECTION, SOLUTION INTRAVENOUS PRN
Status: DISCONTINUED | OUTPATIENT
Start: 2023-10-04 | End: 2023-10-06 | Stop reason: HOSPADM

## 2023-10-04 RX ORDER — DEXAMETHASONE SODIUM PHOSPHATE 4 MG/ML
8 INJECTION, SOLUTION INTRA-ARTICULAR; INTRALESIONAL; INTRAMUSCULAR; INTRAVENOUS; SOFT TISSUE ONCE
Status: COMPLETED | OUTPATIENT
Start: 2023-10-04 | End: 2023-10-04

## 2023-10-04 RX ORDER — KETOROLAC TROMETHAMINE 15 MG/ML
15 INJECTION, SOLUTION INTRAMUSCULAR; INTRAVENOUS ONCE
Status: DISCONTINUED | OUTPATIENT
Start: 2023-10-04 | End: 2023-10-04 | Stop reason: HOSPADM

## 2023-10-04 RX ORDER — FENTANYL CITRATE 0.05 MG/ML
25 INJECTION, SOLUTION INTRAMUSCULAR; INTRAVENOUS EVERY 5 MIN PRN
Status: DISCONTINUED | OUTPATIENT
Start: 2023-10-04 | End: 2023-10-04 | Stop reason: HOSPADM

## 2023-10-04 RX ORDER — SODIUM CHLORIDE 0.9 % (FLUSH) 0.9 %
5-40 SYRINGE (ML) INJECTION PRN
Status: DISCONTINUED | OUTPATIENT
Start: 2023-10-04 | End: 2023-10-04 | Stop reason: HOSPADM

## 2023-10-04 RX ORDER — SODIUM CHLORIDE 0.9 % (FLUSH) 0.9 %
5-40 SYRINGE (ML) INJECTION EVERY 12 HOURS SCHEDULED
Status: DISCONTINUED | OUTPATIENT
Start: 2023-10-04 | End: 2023-10-04 | Stop reason: HOSPADM

## 2023-10-04 RX ORDER — METOCLOPRAMIDE HYDROCHLORIDE 5 MG/ML
10 INJECTION INTRAMUSCULAR; INTRAVENOUS
Status: DISCONTINUED | OUTPATIENT
Start: 2023-10-04 | End: 2023-10-04 | Stop reason: HOSPADM

## 2023-10-04 RX ORDER — ONDANSETRON 2 MG/ML
4 INJECTION INTRAMUSCULAR; INTRAVENOUS
Status: DISCONTINUED | OUTPATIENT
Start: 2023-10-04 | End: 2023-10-04 | Stop reason: HOSPADM

## 2023-10-04 RX ORDER — OXYCODONE HYDROCHLORIDE 5 MG/1
5 TABLET ORAL EVERY 4 HOURS PRN
Status: DISCONTINUED | OUTPATIENT
Start: 2023-10-04 | End: 2023-10-06 | Stop reason: HOSPADM

## 2023-10-04 RX ORDER — FENTANYL CITRATE 50 UG/ML
INJECTION, SOLUTION INTRAMUSCULAR; INTRAVENOUS PRN
Status: DISCONTINUED | OUTPATIENT
Start: 2023-10-04 | End: 2023-10-04 | Stop reason: SDUPTHER

## 2023-10-04 RX ORDER — KETOROLAC TROMETHAMINE 30 MG/ML
INJECTION, SOLUTION INTRAMUSCULAR; INTRAVENOUS
Status: COMPLETED
Start: 2023-10-04 | End: 2023-10-04

## 2023-10-04 RX ORDER — SODIUM CHLORIDE 0.9 % (FLUSH) 0.9 %
5-40 SYRINGE (ML) INJECTION EVERY 12 HOURS SCHEDULED
Status: DISCONTINUED | OUTPATIENT
Start: 2023-10-04 | End: 2023-10-06 | Stop reason: HOSPADM

## 2023-10-04 RX ORDER — SODIUM CHLORIDE 0.9 % (FLUSH) 0.9 %
5-40 SYRINGE (ML) INJECTION PRN
Status: DISCONTINUED | OUTPATIENT
Start: 2023-10-04 | End: 2023-10-06 | Stop reason: HOSPADM

## 2023-10-04 RX ORDER — OXYCODONE HYDROCHLORIDE 5 MG/1
5 TABLET ORAL
Status: DISCONTINUED | OUTPATIENT
Start: 2023-10-04 | End: 2023-10-04 | Stop reason: HOSPADM

## 2023-10-04 RX ORDER — POLYETHYLENE GLYCOL 3350 17 G/17G
17 POWDER, FOR SOLUTION ORAL DAILY PRN
Status: DISCONTINUED | OUTPATIENT
Start: 2023-10-04 | End: 2023-10-06 | Stop reason: HOSPADM

## 2023-10-04 RX ORDER — DEXAMETHASONE SODIUM PHOSPHATE 4 MG/ML
INJECTION, SOLUTION INTRA-ARTICULAR; INTRALESIONAL; INTRAMUSCULAR; INTRAVENOUS; SOFT TISSUE PRN
Status: DISCONTINUED | OUTPATIENT
Start: 2023-10-04 | End: 2023-10-04 | Stop reason: SDUPTHER

## 2023-10-04 RX ORDER — DIPHENHYDRAMINE HYDROCHLORIDE 50 MG/ML
12.5 INJECTION INTRAMUSCULAR; INTRAVENOUS
Status: DISCONTINUED | OUTPATIENT
Start: 2023-10-04 | End: 2023-10-04 | Stop reason: HOSPADM

## 2023-10-04 RX ORDER — ACETAMINOPHEN 325 MG/1
650 TABLET ORAL
Status: DISCONTINUED | OUTPATIENT
Start: 2023-10-04 | End: 2023-10-04 | Stop reason: HOSPADM

## 2023-10-04 RX ORDER — MAGNESIUM HYDROXIDE 1200 MG/15ML
LIQUID ORAL CONTINUOUS PRN
Status: COMPLETED | OUTPATIENT
Start: 2023-10-04 | End: 2023-10-04

## 2023-10-04 RX ORDER — OXYCODONE HYDROCHLORIDE 5 MG/1
5 TABLET ORAL EVERY 6 HOURS PRN
Status: DISCONTINUED | OUTPATIENT
Start: 2023-10-04 | End: 2023-10-06 | Stop reason: HOSPADM

## 2023-10-04 RX ORDER — ONDANSETRON 2 MG/ML
INJECTION INTRAMUSCULAR; INTRAVENOUS PRN
Status: DISCONTINUED | OUTPATIENT
Start: 2023-10-04 | End: 2023-10-04 | Stop reason: SDUPTHER

## 2023-10-04 RX ORDER — SODIUM CHLORIDE 9 MG/ML
INJECTION, SOLUTION INTRAVENOUS CONTINUOUS
Status: DISCONTINUED | OUTPATIENT
Start: 2023-10-04 | End: 2023-10-06 | Stop reason: HOSPADM

## 2023-10-04 RX ORDER — PROPOFOL 10 MG/ML
INJECTION, EMULSION INTRAVENOUS PRN
Status: DISCONTINUED | OUTPATIENT
Start: 2023-10-04 | End: 2023-10-04 | Stop reason: SDUPTHER

## 2023-10-04 RX ORDER — LIDOCAINE HYDROCHLORIDE 20 MG/ML
INJECTION, SOLUTION INTRAVENOUS PRN
Status: DISCONTINUED | OUTPATIENT
Start: 2023-10-04 | End: 2023-10-04 | Stop reason: SDUPTHER

## 2023-10-04 RX ORDER — ACETAMINOPHEN 500 MG
1000 TABLET ORAL ONCE
Status: COMPLETED | OUTPATIENT
Start: 2023-10-04 | End: 2023-10-04

## 2023-10-04 RX ORDER — KETOROLAC TROMETHAMINE 15 MG/ML
15 INJECTION, SOLUTION INTRAMUSCULAR; INTRAVENOUS EVERY 6 HOURS
Status: DISCONTINUED | OUTPATIENT
Start: 2023-10-04 | End: 2023-10-06 | Stop reason: HOSPADM

## 2023-10-04 RX ORDER — SODIUM CHLORIDE 9 MG/ML
INJECTION, SOLUTION INTRAVENOUS CONTINUOUS
Status: DISCONTINUED | OUTPATIENT
Start: 2023-10-04 | End: 2023-10-04 | Stop reason: HOSPADM

## 2023-10-04 RX ORDER — LIDOCAINE HYDROCHLORIDE AND EPINEPHRINE 10; 10 MG/ML; UG/ML
INJECTION, SOLUTION INFILTRATION; PERINEURAL PRN
Status: DISCONTINUED | OUTPATIENT
Start: 2023-10-04 | End: 2023-10-04 | Stop reason: ALTCHOICE

## 2023-10-04 RX ORDER — ROCURONIUM BROMIDE 10 MG/ML
INJECTION, SOLUTION INTRAVENOUS PRN
Status: DISCONTINUED | OUTPATIENT
Start: 2023-10-04 | End: 2023-10-04 | Stop reason: SDUPTHER

## 2023-10-04 RX ORDER — BISACODYL 5 MG/1
5 TABLET, DELAYED RELEASE ORAL DAILY
Status: DISCONTINUED | OUTPATIENT
Start: 2023-10-04 | End: 2023-10-06 | Stop reason: HOSPADM

## 2023-10-04 RX ORDER — MIDAZOLAM HYDROCHLORIDE 1 MG/ML
INJECTION INTRAMUSCULAR; INTRAVENOUS PRN
Status: DISCONTINUED | OUTPATIENT
Start: 2023-10-04 | End: 2023-10-04 | Stop reason: SDUPTHER

## 2023-10-04 RX ADMIN — Medication 0.1 MG: at 13:41

## 2023-10-04 RX ADMIN — KETOROLAC TROMETHAMINE 15 MG: 30 INJECTION, SOLUTION INTRAMUSCULAR at 22:28

## 2023-10-04 RX ADMIN — FENTANYL CITRATE 25 MCG: 50 INJECTION, SOLUTION INTRAMUSCULAR; INTRAVENOUS at 15:05

## 2023-10-04 RX ADMIN — FENTANYL CITRATE 25 MCG: 50 INJECTION INTRAMUSCULAR; INTRAVENOUS at 15:45

## 2023-10-04 RX ADMIN — SODIUM CHLORIDE: 9 INJECTION, SOLUTION INTRAVENOUS at 10:22

## 2023-10-04 RX ADMIN — CEFAZOLIN 2000 MG: 2 INJECTION, POWDER, FOR SOLUTION INTRAMUSCULAR; INTRAVENOUS at 20:33

## 2023-10-04 RX ADMIN — Medication 0.1 MG: at 14:20

## 2023-10-04 RX ADMIN — ACETAMINOPHEN 650 MG: 325 TABLET ORAL at 20:16

## 2023-10-04 RX ADMIN — ROCURONIUM BROMIDE 20 MG: 10 INJECTION, SOLUTION INTRAVENOUS at 14:14

## 2023-10-04 RX ADMIN — BISACODYL 5 MG: 5 TABLET, COATED ORAL at 20:42

## 2023-10-04 RX ADMIN — ROCURONIUM BROMIDE 50 MG: 10 INJECTION, SOLUTION INTRAVENOUS at 12:46

## 2023-10-04 RX ADMIN — LIDOCAINE HYDROCHLORIDE 50 MG: 20 INJECTION, SOLUTION INTRAVENOUS at 12:46

## 2023-10-04 RX ADMIN — PROPOFOL 100 MG: 10 INJECTION, EMULSION INTRAVENOUS at 12:46

## 2023-10-04 RX ADMIN — Medication 0.1 MG: at 14:30

## 2023-10-04 RX ADMIN — SODIUM CHLORIDE, PRESERVATIVE FREE 10 ML: 5 INJECTION INTRAVENOUS at 20:17

## 2023-10-04 RX ADMIN — Medication 0.1 MG: at 14:11

## 2023-10-04 RX ADMIN — SUGAMMADEX 200 MG: 100 INJECTION, SOLUTION INTRAVENOUS at 15:15

## 2023-10-04 RX ADMIN — CEFAZOLIN 2000 MG: 2 INJECTION, POWDER, FOR SOLUTION INTRAMUSCULAR; INTRAVENOUS at 12:57

## 2023-10-04 RX ADMIN — ONDANSETRON 4 MG: 2 INJECTION INTRAMUSCULAR; INTRAVENOUS at 14:55

## 2023-10-04 RX ADMIN — Medication 0.1 MG: at 14:00

## 2023-10-04 RX ADMIN — Medication 0.1 MG: at 13:51

## 2023-10-04 RX ADMIN — DEXAMETHASONE SODIUM PHOSPHATE 4 MG: 4 INJECTION INTRA-ARTICULAR; INTRALESIONAL; INTRAMUSCULAR; INTRAVENOUS; SOFT TISSUE at 13:15

## 2023-10-04 RX ADMIN — FENTANYL CITRATE 25 MCG: 50 INJECTION, SOLUTION INTRAMUSCULAR; INTRAVENOUS at 13:41

## 2023-10-04 RX ADMIN — MIDAZOLAM HYDROCHLORIDE 2 MG: 1 INJECTION, SOLUTION INTRAMUSCULAR; INTRAVENOUS at 12:42

## 2023-10-04 RX ADMIN — ACETAMINOPHEN 1000 MG: 500 TABLET ORAL at 10:07

## 2023-10-04 RX ADMIN — ROCURONIUM BROMIDE 20 MG: 10 INJECTION, SOLUTION INTRAVENOUS at 13:31

## 2023-10-04 RX ADMIN — SODIUM CHLORIDE: 9 INJECTION, SOLUTION INTRAVENOUS at 20:28

## 2023-10-04 RX ADMIN — DEXAMETHASONE SODIUM PHOSPHATE 8 MG: 4 INJECTION INTRA-ARTICULAR; INTRALESIONAL; INTRAMUSCULAR; INTRAVENOUS; SOFT TISSUE at 10:22

## 2023-10-04 RX ADMIN — KETOROLAC TROMETHAMINE 15 MG: 30 INJECTION, SOLUTION INTRAMUSCULAR; INTRAVENOUS at 10:33

## 2023-10-04 ASSESSMENT — PAIN SCALES - GENERAL
PAINLEVEL_OUTOF10: 2
PAINLEVEL_OUTOF10: 3
PAINLEVEL_OUTOF10: 5
PAINLEVEL_OUTOF10: 3
PAINLEVEL_OUTOF10: 5
PAINLEVEL_OUTOF10: 3
PAINLEVEL_OUTOF10: 5

## 2023-10-04 ASSESSMENT — ENCOUNTER SYMPTOMS
NAUSEA: 0
SORE THROAT: 0
WHEEZING: 0
PHOTOPHOBIA: 0
RHINORRHEA: 0
EYES NEGATIVE: 1
ABDOMINAL PAIN: 0
SHORTNESS OF BREATH: 0
BACK PAIN: 0
ALLERGIC/IMMUNOLOGIC NEGATIVE: 1
VOMITING: 0

## 2023-10-04 ASSESSMENT — PAIN DESCRIPTION - DESCRIPTORS
DESCRIPTORS: SORE
DESCRIPTORS: BURNING
DESCRIPTORS: SORE

## 2023-10-04 ASSESSMENT — PAIN - FUNCTIONAL ASSESSMENT
PAIN_FUNCTIONAL_ASSESSMENT: 0-10
PAIN_FUNCTIONAL_ASSESSMENT: PREVENTS OR INTERFERES SOME ACTIVE ACTIVITIES AND ADLS

## 2023-10-04 ASSESSMENT — PAIN DESCRIPTION - ORIENTATION
ORIENTATION: LEFT;RIGHT
ORIENTATION: LEFT;RIGHT

## 2023-10-04 ASSESSMENT — PAIN DESCRIPTION - LOCATION
LOCATION: BACK

## 2023-10-04 NOTE — DISCHARGE INSTR - COC
Continuity of Care Form    Patient Name: Juliana Ahmadi   :  1940  MRN:  [id number]    Admit date:  10/4/2023  Discharge date:  ***    Code Status Order: Full Code   Advance Directives:   Advance Care Flowsheet Documentation       Date/Time Healthcare Directive Type of Healthcare Directive Copy in [address] Agent's Name Healthcare Agent's Phone Number    10/04/23 8629 Yes, patient has an advance directive for healthcare treatment Living will Yes, copy in chart Adult [facility] [phone]            Admitting Physician:  Mike Castillo MD  PCP: Niranjan Dsouza DO    Discharging Nurse: [facility] [address] Unit Phone Number: ***    Emergency Contact:   Extended Emergency Contact Information  Primary Emergency Contact: Nanci K[place] [address] [address] [place] Phone: [phone]  Relation: Child  Secondary Emergency Contact: Libra Tang [place] [address] [address] Phone: [phone]  Relation: None    Past Surgical History:  Past Surgical History:   Procedure Laterality Date    BACK SURGERY  2017    Spinal fusion    BREAST BIOPSY Right 2015    needle biopsy-right breast; Ying TRAN M.D. CATARACT REMOVAL  2010    BOTH    COLONOSCOPY  2009    DR ZOHREH CHOI'S RELEASE Right 2015    trigger finger    EYE SURGERY      Ptosis-right eyelid; blepharoplasty-left eyelid    FOOT SURGERY  1999    HYSTERECTOMY (CERVIX STATUS UNKNOWN)  1979    LUMBAR [facility] SURGERY  2008    lumbar microdiscectomy-DR. WILKINS    RHINOPLASTY  1986    UPPER GASTROINTESTINAL ENDOSCOPY  09,2013    DR BRUNER    UPPER GASTROINTESTINAL ENDOSCOPY  2014       Immunization History:   Immunization History   Administered Date(s) Administered    COVID-19, MODERNA Bivalent, (age 12y+), IM, 48 mcg/0.5 mL 10/12/2022       Active Problems:  Patient Active Problem List   Diagnosis Code

## 2023-10-04 NOTE — ANESTHESIA POSTPROCEDURE EVALUATION
Department of Anesthesiology  Postprocedure Note    Patient: Trever Aguila  MRN: [id number]  YOB: 1940  Date of evaluation: 10/4/2023      Procedure Summary     Date: 10/04/23 Room / Location: [address]    Anesthesia Start: 8774 Anesthesia Stop: 5696    Procedure: L3-4  MICRODECOMPRESSION (Spine Lumbar) Diagnosis:       Lumbar stenosis with neurogenic claudication      (Lumbar stenosis with neurogenic claudication [M48.062])    Surgeons: Reji Vargas MD Responsible Provider: Tenisha Bernal MD    Anesthesia Type: General ASA Status: 3          Anesthesia Type: General    Dana Phase I: Dana Score: 7    Dana Phase II:        Anesthesia Post Evaluation    Patient location during evaluation: bedside  Patient participation: complete - patient participated  Level of consciousness: awake and awake and alert  Airway patency: patent  Nausea & Vomiting: no nausea and no vomiting  Complications: no  Cardiovascular status: blood pressure returned to baseline and hemodynamically stable  Respiratory status: acceptable  Hydration status: euvolemic  Pain management: adequate

## 2023-10-04 NOTE — DISCHARGE INSTRUCTIONS
Every day after surgery change dressing frequently to keep wound clean and dry using 4X4 gauze pads and paper tape. May shower in 3 days postoperative. Do not soak or soap wound for one week post operative. Recheck one month. Questions call office [phone].

## 2023-10-04 NOTE — INTERVAL H&P NOTE
Update History & Physical    The patient's History and Physical of was reviewed with the patient and I examined the patient. There was no change. The surgical site was confirmed by the patient and me. Plan: The risks, benefits, expected outcome, and alternative to the recommended procedure have been discussed with the patient. Patient understands and wants to proceed with the procedure.      Electronically signed by Nilton Middleton MD on 10/4/2023 at 12:19 PM

## 2023-10-04 NOTE — BRIEF OP NOTE
Brief Postoperative Note      Patient: Suman Merrill  YOB: 1940  MRN: [id number]    Date of Procedure: 10/4/2023    Pre-Op Diagnosis Codes:     * Lumbar stenosis with neurogenic claudication [M48.062]    Post-Op Diagnosis: Same       Procedure(s):  L3-4  MICRODECOMPRESSION    Surgeon(s):  Cynthia Pinto MD    Assistant:  First Assistant: Freedom Little    Anesthesia: General    Estimated Blood Loss (mL): Minimal    Complications: None          Drains:   Closed/Suction Drain Inferior;Midline Back Accordion (Active)       Findings: severe canal stenosis      Electronically signed by Cynthia Pinto MD on 10/4/2023 at 3:01 PM

## 2023-10-04 NOTE — OP NOTE
[facility], [address] [facility]                                OPERATIVE REPORT    PATIENT NAME: Nilda Rg                      :        1940  MED REC NO:   [id number]                            ROOM:  ACCOUNT NO:   [de-identified]                           ADMIT DATE: 10/04/2023  PROVIDER:     Shantal Mcfadden MD    DATE OF PROCEDURE:  10/04/2023    PREOPERATIVE DIAGNOSIS:  L3-4 canal stenosis with neurogenic  claudication. POSTOPERATIVE DIAGNOSIS:  L3-4 canal stenosis with neurogenic  claudication. OPERATIONS PERFORMED:  Bilateral L3-4 laminectomies, microdissection,  and decompressions. SURGEON:  Shantal Mcfadden MD    DESCRIPTION OF PROCEDURE:  The patient was given general endotracheal  anesthesia in supine position. Turned to prone position. Back was  prepped and draped. Time-out, patient identified. Needle was placed. Lateral x-rays were obtained to confirm level. Needle was withdrawn. The patient had prior lumbar fusion with instrumentation at L4-5, S1.   Dissection was carried down the spinous process tips of L3-4. The  pedicle screw on the left L4 was identified. The pedicle screw on the  right side was more lateral and could be palpated. The self-retaining  retractor was placed. Using acting needles for markers and several  x-rays, I able to locate the L3-4 spinous processes and lamina. Beginning on the patient's right side, the spinous process of the  superior aspect of L4 and inferior aspect of L3 was removed. Using the  ligamentum flavum, a hemilaminectomy of the inferior aspect of L3 was  performed working superiorly not laterally. There is scarring at the  lamina of L4. With the use of microscope, very careful microdissection,  I was able to remove some of the scar from the superior aspect of lamina  of L4 and perform a laminectomy of the superior aspect of L4 working  inferiorly not laterally.   Because of scarring anatomy, I went to the  patient's left side. On the patient's left side, similarly the  laminectomy of the inferior aspect of L3, superior aspect of L4 was  performed. The hypertrophied ligamentum flavum was removed during a  medial facetectomy lateral to the pedicle of L4, staying medial to the  pedicle of L4 removing all of the pressure off the dural sac. I then  went back to the patient's right side and did the same by removing the  hypertrophied ligamentum flavum and performing a medial facetectomy  staying medial to the pedicle performing a foraminotomy for the exit of  the L4 nerve root around the L4 pedicle. The dural sac and nerve roots  were completely decompressed. The wound was thoroughly irrigated. The  wound drain was placed. Temporary use of Gelfoam, all removed. Surgiflo was used to control the bleeding. The dura was pulsating. The  retractors were all removed. The fascia was closed with 0 Vicryl  suture. Subcutaneous tissues were closed with 2-0, 3-0, and 4-0 Vicryl  sutures. EBL less than 20 mL. No blood transfused.         Jinny Figueroa MD    D: 10/04/2023 15:10:43       T: 10/04/2023 15:14:37     [email or web address]  Job#: [id number]     Doc#: [id number]    CC:

## 2023-10-05 PROBLEM — R33.8 ACUTE URINARY RETENTION: Status: ACTIVE | Noted: 2023-10-05

## 2023-10-05 PROBLEM — R33.9 URINARY RETENTION: Status: ACTIVE | Noted: 2023-10-05

## 2023-10-05 LAB — CREAT SERPL-MCNC: 0.88 MG/DL (ref 0.5–0.9)

## 2023-10-05 PROCEDURE — G0378 HOSPITAL OBSERVATION PER HR: HCPCS

## 2023-10-05 PROCEDURE — 2580000003 HC RX 258: Performed by: NEUROLOGICAL SURGERY

## 2023-10-05 PROCEDURE — 96374 THER/PROPH/DIAG INJ IV PUSH: CPT

## 2023-10-05 PROCEDURE — 6360000002 HC RX W HCPCS: Performed by: NEUROLOGICAL SURGERY

## 2023-10-05 PROCEDURE — 36415 COLL VENOUS BLD VENIPUNCTURE: CPT

## 2023-10-05 PROCEDURE — 6370000000 HC RX 637 (ALT 250 FOR IP): Performed by: NEUROLOGICAL SURGERY

## 2023-10-05 PROCEDURE — 2700000000 HC OXYGEN THERAPY PER DAY

## 2023-10-05 PROCEDURE — 96376 TX/PRO/DX INJ SAME DRUG ADON: CPT

## 2023-10-05 PROCEDURE — 96361 HYDRATE IV INFUSION ADD-ON: CPT

## 2023-10-05 PROCEDURE — 97161 PT EVAL LOW COMPLEX 20 MIN: CPT

## 2023-10-05 PROCEDURE — 99221 1ST HOSP IP/OBS SF/LOW 40: CPT | Performed by: PHYSICIAN ASSISTANT

## 2023-10-05 PROCEDURE — 97166 OT EVAL MOD COMPLEX 45 MIN: CPT

## 2023-10-05 PROCEDURE — 82565 ASSAY OF CREATININE: CPT

## 2023-10-05 PROCEDURE — 51701 INSERT BLADDER CATHETER: CPT

## 2023-10-05 PROCEDURE — 51798 US URINE CAPACITY MEASURE: CPT

## 2023-10-05 RX ADMIN — ACETAMINOPHEN 650 MG: 325 TABLET ORAL at 15:55

## 2023-10-05 RX ADMIN — SODIUM CHLORIDE: 9 INJECTION, SOLUTION INTRAVENOUS at 03:15

## 2023-10-05 RX ADMIN — ACETAMINOPHEN 650 MG: 325 TABLET ORAL at 21:14

## 2023-10-05 RX ADMIN — KETOROLAC TROMETHAMINE 15 MG: 30 INJECTION, SOLUTION INTRAMUSCULAR at 21:15

## 2023-10-05 RX ADMIN — SODIUM CHLORIDE, PRESERVATIVE FREE 10 ML: 5 INJECTION INTRAVENOUS at 21:15

## 2023-10-05 RX ADMIN — KETOROLAC TROMETHAMINE 15 MG: 30 INJECTION, SOLUTION INTRAMUSCULAR at 03:08

## 2023-10-05 RX ADMIN — CEFAZOLIN 2000 MG: 2 INJECTION, POWDER, FOR SOLUTION INTRAMUSCULAR; INTRAVENOUS at 05:00

## 2023-10-05 RX ADMIN — ACETAMINOPHEN 650 MG: 325 TABLET ORAL at 03:08

## 2023-10-05 RX ADMIN — KETOROLAC TROMETHAMINE 15 MG: 30 INJECTION, SOLUTION INTRAMUSCULAR at 13:44

## 2023-10-05 RX ADMIN — ACETAMINOPHEN 650 MG: 325 TABLET ORAL at 10:04

## 2023-10-05 RX ADMIN — KETOROLAC TROMETHAMINE 15 MG: 30 INJECTION, SOLUTION INTRAMUSCULAR at 09:28

## 2023-10-05 ASSESSMENT — PAIN DESCRIPTION - ORIENTATION
ORIENTATION: LOWER
ORIENTATION: RIGHT;LEFT

## 2023-10-05 ASSESSMENT — PAIN DESCRIPTION - LOCATION
LOCATION: BACK

## 2023-10-05 ASSESSMENT — PAIN SCALES - GENERAL
PAINLEVEL_OUTOF10: 3
PAINLEVEL_OUTOF10: 2
PAINLEVEL_OUTOF10: 0
PAINLEVEL_OUTOF10: 2
PAINLEVEL_OUTOF10: 3
PAINLEVEL_OUTOF10: 3
PAINLEVEL_OUTOF10: 2

## 2023-10-05 ASSESSMENT — ENCOUNTER SYMPTOMS: APNEA: 0

## 2023-10-05 ASSESSMENT — PAIN DESCRIPTION - DESCRIPTORS: DESCRIPTORS: ACHING

## 2023-10-05 NOTE — PROGRESS NOTES
[facility] OCCUPATIONAL THERAPY EVALUATION - ACUTE     NAME: Francisca Jones  : 1940 (78 y.o.)  MRN: [id number]  CODE STATUS: Full Code  Room: W280/W280-01    Date of Service: 10/5/2023    Patient Diagnosis(es): Lumbar stenosis with neurogenic claudication [M48.062]  Spinal stenosis, lumbar region, with neurogenic claudication [M48.062]   Patient Active Problem List    Diagnosis Date Noted    Urinary retention 10/05/2023    Pain 10/04/2023    Spinal stenosis, lumbar region, with neurogenic claudication 10/04/2023    Lumbar spondylosis 2023    Peroneal tendinitis of left lower leg 2023    Atherosclerotic peripheral vascular disease with intermittent claudication ([address]) 2023    History of lumbar fusion 2023    Lumbar stenosis with neurogenic claudication 2023    Thrombophlebitis 05/10/2023    Essential tremor 05/10/2023    Thrombophlebitis of superficial veins of right lower extremity 2018    Acquired spondylolisthesis of lumbosacral region 10/05/2017    HNP (herniated nucleus pulposus), lumbar 10/05/2017    Neurogenic claudication due to lumbar spinal stenosis 10/03/2017    Piriformis syndrome 2017    Lumbosacral stenosis 2017    Lumbar radiculopathy 2017    Allergic rhinitis 2012    GERD (gastroesophageal reflux disease)     Scoliosis     Hyperlipidemia     OA (osteoarthritis)     HNP (herniated nucleus pulposus)     Diverticulosis     Gastritis     Cataracts, both eyes         Past Medical History:   Diagnosis Date    Allergic rhinitis     Arthritis     Chronic back pain     Diverticulosis 2009    Gastritis 2009    GERD (gastroesophageal reflux disease)     GI problem     HNP (herniated nucleus pulposus)     L SPINE    Hx of blood clots 2023    DVT left leg    Hyperlipidemia     OA (osteoarthritis)     Retention of urine     last back surgery (4 hours surgery) she could not urinate or have a bowel movement for multiple days    Scoliosis Past Surgical History:   Procedure Laterality Date    BACK SURGERY  11/2017    Spinal fusion    BREAST BIOPSY Right 04/27/2015    needle biopsy-right breast; Eliot TRAN M.D. CATARACT REMOVAL  01/01/2010    BOTH    COLONOSCOPY  05/12/2009    DR ZOHREH CHOI'S RELEASE Right 01/2015    trigger finger    EYE SURGERY      Ptosis-right eyelid; blepharoplasty-left eyelid    FOOT SURGERY  01/01/1999    HYSTERECTOMY (CERVIX STATUS UNKNOWN)  01/01/1979    LAMINECTOMY N/A 10/4/2023    L3-4  MICRODECOMPRESSION performed by Lopez Soto MD at [address]  01/01/2008    lumbar microdiscectomy-DR. WILKINS    RHINOPLASTY  01/01/1986    UPPER GASTROINTESTINAL ENDOSCOPY  4/28/09,1/4/2013    DR BRUNER    UPPER GASTROINTESTINAL ENDOSCOPY  05/07/2014        Restrictions  Restrictions/Precautions: Up as Tolerated     Safety Devices: Safety Devices  Type of Devices: Bed alarm in place;Call light within reach; Left in bed     Patient's date of birth confirmed: Yes    General:  Chart Reviewed: Yes  Patient assessed for rehabilitation services?: Yes    Subjective  Subjective: \"I want to get home\"       Pain at start of treatment: Yes: 2/10    Pain at end of treatment: Yes: 2/10    Location: Low back    Description: Sore  Nursing notified: Declined  RN:   Intervention: Repositioned    Prior Level of Function:  Social/Functional History  Lives With: Alone  Type of Home: Condo  Home Layout: One level  Home Access: Level entry  Bathroom Shower/Tub: Walk-in shower  Bathroom Toilet: Handicap height  Bathroom Equipment: Shower chair  Home Equipment: None  Has the patient had two or more falls in the past year or any fall with injury in the past year?: No  ADL Assistance: [address] Independent  Homemaking Responsibilities: Yes  Ambulation Assistance: Independent (no AD)  Transfer Assistance: Independent  Active : Yes  Occupation: Retired  Leisure & Hobbies: bowling  Additional

## 2023-10-05 NOTE — PLAN OF CARE
Problem: Discharge Planning  Goal: Discharge to home or other facility with appropriate resources  Outcome: Progressing  Flowsheets (Taken 10/4/2023 2010)  Discharge to home or other facility with appropriate resources:   Identify barriers to discharge with patient and caregiver   Arrange for needed discharge resources and transportation as appropriate   Identify discharge learning needs (meds, wound care, etc)     Problem: Pain  Goal: Verbalizes/displays adequate comfort level or baseline comfort level  Outcome: Progressing  Flowsheets (Taken 10/4/2023 1939)  Verbalizes/displays adequate comfort level or baseline comfort level: Encourage patient to monitor pain and request assistance     Problem: ABCDS Injury Assessment  Goal: Absence of physical injury  Outcome: Progressing     Problem: Safety - Adult  Goal: Free from fall injury  Outcome: Progressing

## 2023-10-05 NOTE — PROGRESS NOTES
Physical Therapy Med Surg Initial Assessment  [facility]  [facility]       NAME: Trever Aguila  : 1940 (78 y.o.)  MRN: [id number]  CODE STATUS: Full Code    Date of Service: 10/5/2023    Patient Diagnosis(es): Lumbar stenosis with neurogenic claudication [M48.062]  Spinal stenosis, lumbar region, with neurogenic claudication [M48.062]   No chief complaint on file.     Patient Active Problem List    Diagnosis Date Noted    Acute urinary retention 10/05/2023    Pain 10/04/2023    Spinal stenosis, lumbar region, with neurogenic claudication 10/04/2023    Lumbar spondylosis 2023    Peroneal tendinitis of left lower leg 2023    Atherosclerotic peripheral vascular disease with intermittent claudication ([address]) 2023    History of lumbar fusion 2023    Lumbar stenosis with neurogenic claudication 2023    Thrombophlebitis 05/10/2023    Essential tremor 05/10/2023    Thrombophlebitis of superficial veins of right lower extremity 2018    Acquired spondylolisthesis of lumbosacral region 10/05/2017    HNP (herniated nucleus pulposus), lumbar 10/05/2017    Neurogenic claudication due to lumbar spinal stenosis 10/03/2017    Piriformis syndrome 2017    Lumbosacral stenosis 2017    Lumbar radiculopathy 2017    Allergic rhinitis 2012    GERD (gastroesophageal reflux disease)     Scoliosis     Hyperlipidemia     OA (osteoarthritis)     HNP (herniated nucleus pulposus)     Diverticulosis     Gastritis     Cataracts, both eyes         Past Medical History:   Diagnosis Date    Allergic rhinitis     Arthritis     Chronic back pain     Diverticulosis 2009    Gastritis 2009    GERD (gastroesophageal reflux disease)     GI problem     HNP (herniated nucleus pulposus)     L SPINE    Hx of blood clots 2023    DVT left leg    Hyperlipidemia     OA (osteoarthritis)     Retention of urine     last back surgery (4 hours surgery) she Strengthening, ROM, Balance training, Functional mobility training, Transfer training, Endurance training, Gait training, Stair training, Neuromuscular re-education, Pain management, Home exercise program, Safety education & training, Patient/Caregiver education & training, Equipment evaluation, education, & procurement, Positioning, Therapeutic activities    Safety Devices  Type of Devices: Bed alarm in place, Call light within reach, Left in bed    Goals:  Long Term Goals  Long Term Goal 1: Pt will demonstrate bed mobility indep  Long Term Goal 2: Pt will demonstrate transfers indep  Long Term Goal 3: Pt will demonstrate amb >/= 100ft indep  Long Term Goal 4: Pt will demonstrate TUG </= 15 sec for decreased risk for falls    [facility] (6 CLICK) [address] Mobility Raw Score : 18     Therapy Time:   Individual   Time In 1011   Time Out 1028   Minutes 17       Eval X 17 min    Kyra Wheeler PT, 10/05/23 at 11:41 AM         Definitions for assistance levels  Independent = pt does not require any physical supervision or assistance from another person for activity completion. Device may be needed.   Stand by assistance = pt requires verbal cues or instructions from another person, close to but not touching, to perform the activity  Minimal assistance= pt performs 75% or more of the activity; assistance is required to complete the activity  Moderate assistance= pt performs 50% of the activity; assistance is required to complete the activity  Maximal assistance = pt performs 25% of the activity; assistance is required to complete the activity  Dependent = pt requires total physical assistance to accomplish the task

## 2023-10-05 NOTE — CONSULTS
Consult Note    Reason for Consult: Urinary retention and long-term anticoagulation. Requesting Physician:  Russell Davenport MD    HISTORY OF PRESENT ILLNESS:       Patient is status post bilateral L3-4 laminectomies, microdissection, and decompression. Patient is a pleasant, alert and oriented x1 12-year-old  female. Patient reports that she has feeling well postsurgically and states that her back pain is minimal and tolerable. Patient denies any shortness of breath, chest pain, nausea, vomiting, or abdominal pain, or dysuria. Patient has been able to tolerate p.o. intake without any difficulty. Patient's only concern at this time is that she may have urinary retention after surgery because interim past surgery this became a big problem for her. At this time, she does not have the sensation or urge to urinate. I explained to patient that we will monitor closely and attempt to have her urinate on her own prior to any catheterizations. Patient reports that the only medication she takes on a daily basis is [address] due to a history of unprovoked left leg DVT this past January. Patient denies use of tobacco, alcohol, or illicit drugs. Past Medical History:   Diagnosis Date    Allergic rhinitis     Arthritis     Chronic back pain     Diverticulosis 01/01/2009    Gastritis 01/01/2009    GERD (gastroesophageal reflux disease)     GI problem     HNP (herniated nucleus pulposus)     L SPINE    Hx of blood clots 01/2023    DVT left leg    Hyperlipidemia     OA (osteoarthritis)     Retention of urine     last back surgery (4 hours surgery) she could not urinate or have a bowel movement for multiple days    Scoliosis        Past Surgical History:   Procedure Laterality Date    BACK SURGERY  11/2017    Spinal fusion    BREAST BIOPSY Right 04/27/2015    needle biopsy-right breast; Suleiman TRAN M.D.     CATARACT REMOVAL  01/01/2010    BOTH    COLONOSCOPY  05/12/2009    DR ZOHREH CHOI'S RELEASE Right complication on the images provided. Please see subspecialty report for full details and interpretation of real time imaging. Intraprocedural fluoroscopic spot images as above. See separate procedure report for more information. Assessment/Plan:  Lumbar stenosis with neurogenic claudication: Status post bilateral L3-4 laminectomies, microdissection, and decompression. Being managed by neurosurgery. We will assist with any postsurgical concerns if needed. History of urinary retention: Postsurgical urinary retention issues in the past.  We will monitor at this time and bladder scan and straight cath if needed. If needed, we can consult urology. History of DVT: Patient on [address] Neurosurgery recommends restarting anticoagulation 3 days postsurgery. We will start patient's [address] on 10/7/2023.      Electronically signed by DYLAN Aviles - CNP on 10/4/23 at 8:47 PM EDT    Jayme Chaparro MD - supervising physician

## 2023-10-05 NOTE — PLAN OF CARE
See OT evaluation for all goals and OT POC.  Electronically signed by KIRA Hayes/L on 10/5/2023 at 1:01 PM

## 2023-10-05 NOTE — PROGRESS NOTES
Patient ambulated to bathroom and voided successfully (unable to measure as a hat was not present). Patient reports feeling relief and felt like she emptied her bladder completely. Post void residual scan was done and showed 59ml max. Encouraged patient to keep drinking fluids and continue to move around. Will continue to monitor.

## 2023-10-05 NOTE — PROGRESS NOTES
Received a call from [facility], they accept the patient for [address] at discharge.

## 2023-10-05 NOTE — CONSULTS
fracture. MACRO: None    MRI LUMBAR SPINE WO CONTRAST    Result Date: 9/10/2023  MRN: [id number] Patient Name: Aristides Telles  STUDY: MRI L-SPINE WO; ;  9/10/2023 10:30 am  INDICATION: img [id number]. COMPARISON: MRI of the lumbar spine from 08/31/2017. CT lumbar spine from 11/23/2022. ACCESSION NUMBER(S): [id number]  ORDERING CLINICIAN: DAVID WILKINS  TECHNIQUE: MRI of the lumbar spine was performed with acquisition of sagittal T2, sagittal T1, sagittal STIR, axial T1, and axial T2 weighted sequences. FINDINGS: This report assumes 5 non-rib bearing lumbar vertebral bodies. The lowest intervertebral disc will be labeled L5-S1. There is mild levocurvature. There is stable slight retrolisthesis at T12-L1 and L2-L3 and grade 1 anterolisthesis at L4-L5 and L5-S1. There are postoperative changes from placement of bilateral fixation screws within the L4 and L5 vertebral bodies and are interconnected by parallel rods. There is also an intervertebral disc spacer at L4-L5. Vertebral body heights are maintained. There is mild intervertebral disc height narrowing at multiple levels and moderate intervertebral disc height narrowing at L1-L2. There are chronic degenerative endplate changes including osteophyte formation and endplate sclerosis at multiple levels. There is STIR hyperintense signal located within the T12-L1 and L3-L4 endplates which is most consistent with degenerative osseous edema versus reactive hypervascularity. Marrow signal is somewhat heterogeneous in appearance but is unchanged. Conus medullaris terminates at the level of L2 and is unremarkable in appearance. At T12-L1, there is a small diffuse disc bulge partially effaces the ventral thecal sac. There is no spinal canal stenosis. There is very mild right neural foraminal narrowing due to disc extension into the neural foramina and due to facet osteoarthropathy. There is no narrowing of the left neural foramen.  There is overall mild-to-moderate bilateral facet and high risk medications,   blood pressure and blood sugar control. It is my opinion that they will be able to tolerate and benefit from 3 hours of therapy a day. I reviewed the various options re: levels of care with the patient and family. Please see pre-admission screen note for further details. I discussed acute rehab with the patient and verify that the patient is able and willing to participate in 3 hours of therapy a day. Rehab and Acute Care Case Management has also reinforced this expectation. This patient requires multidisciplinary rehabilitation treatment, including daily care and management from a PM&R physician, 24-hour rehabilitation nursing, Physical Therapy, Occupational Therapy, rehabilitation psychology, consideration of speech and language pathology, recreational therapy, nutritional services, and a rehabilitation . I feel that it is reasonable to plan for a discharge to home setting after acute rehab. Specialized nursing care to focus on: Bowel and bladder issues-Monitor for urinary retention-check PVRs, bladder scan--cath if no void. Wound risk and management   -pressure relief protocols-side to side turns  IVF medication administration      Monitor endurance and if necessary spread therapy out over a 7-day window-adding scheduled rest breaks when needed. Focus on energy conservation. Monitor heart rate and   cardiac medications effects on heart rate and blood pressure before, during and after therapy. Progress toward endurance training with pulse ox monitoring for saturation and heart rate. continue to monitor closely for dehydration-- Improve hydration and nutrition by adding Vitamin B12 shot times one, adding Protein supplements and push PO fluids. Treat and monitor for higher level cognitive deficits, focus on difficulty with sequencing and problem-solving.     Focus on higher-level balance and falls risk issues focusing on balance training and

## 2023-10-05 NOTE — PROGRESS NOTES
Hospitalist Daily Progress Note  Name: Christ Regan  Age: 78 y.o. Gender: female  CodeStatus: Full Code  Allergies: Latex  Formaldehyde    Chief Complaint:No chief complaint on file. Primary Care Provider: Jaime Johnston DO    InpatientTreatment Team: Treatment Team: Attending Provider: Lopez Soto MD; Surgeon: Lopez Soto MD; Consulting Physician: Ambrocio Lima DO; Consulting Physician: Bryan Barraza MD; Consulting Physician: Cheyenne Ruvalcaba MD; : Adolph Guzman RN; Registered Nurse: Mireya Mix RN; Patient Care Tech: Robyne Route; Utilization Reviewer: Aliyah Sheth RN    Admission Date: 10/4/2023      Subjective: No chest pain, sob, nausea. Back pain well controlled. Physical Exam  Vitals and nursing note reviewed. Constitutional:       Appearance: Normal appearance. Cardiovascular:      Rate and Rhythm: Normal rate and regular rhythm. Pulmonary:      Effort: Pulmonary effort is normal.      Breath sounds: Normal breath sounds. Abdominal:      General: Bowel sounds are normal.      Palpations: Abdomen is soft. Musculoskeletal:         General: Normal range of motion. Skin:     General: Skin is warm and dry. Neurological:      Mental Status: She is alert and oriented to person, place, and time. Mental status is at baseline. Medications:  Reviewed    Infusion Medications:    sodium chloride 100 mL/hr at 10/05/23 0315    sodium chloride       Scheduled Medications:    sodium chloride flush  5-40 mL IntraVENous 2 times per day    acetaminophen  650 mg Oral Q6H    bisacodyl  5 mg Oral Daily    ketorolac  15 mg IntraVENous Q6H    [START ON 10/7/2023] apixaban  5 mg Oral BID     PRN Meds: sodium chloride flush, sodium chloride, oxyCODONE **OR** oxyCODONE, HYDROmorphone **OR** HYDROmorphone, ondansetron, magnesium hydroxide, polyethylene glycol    Labs:   No results for input(s): \"WBC\", \"HGB\", \"HCT\", \"PLT\" in the last 72 hours.   No results

## 2023-10-05 NOTE — PROGRESS NOTES
Postoperative day #1 L3-4 decompression. Patient has good strength feeling in the lower extremities. Wound drain is functioning well and will remain in place. Developed urinary retention requiring Martel catheter. This is happened to her when undergoing previous surgeries with anesthesia. To be evaluated by therapies rehabilitation for discharge planning. She is an excellent candidate for discharge transfer to rehabilitation.

## 2023-10-05 NOTE — CONSULTS
Urology Consult      Maria G Borden  1940  [id number]    Date of Admission:  10/4/2023  9:01 AM  Date of Consultation:  10/5/2023    Consultant: Dori Victor PA-C  SupervisingPhysician: Dr. Jaiden Higgins  PCP:  Coby Cruz DO       Reason for Consultation: urinary retention      C/C: No chief complaint on file. History of Present Illness: Urinary Retention  Patient complains of urinary retention. Onset of retention was 1 day ago and was gradual in onset. Patient currently does not have a urinary catheter in place. ml of urine were drained when catheter was placed. Prior to this event voiding symptoms consisted of slow stream. Prior treatments include n/a. Recent medications that may have affected his voiding include none. Allergies: Allergies   Allergen Reactions    Latex Other (See Comments)     Pt reports no physical reaction; she has an aversion to the smell of latex or cheap plastic    Formaldehyde Itching and Swelling     Shortness of breath/ throat swelling  Shortness of breath/ throat swelling         PMH: Patient has a past medical history of Allergic rhinitis, Arthritis, Chronic back pain, Diverticulosis, Gastritis, GERD (gastroesophageal reflux disease), GI problem, HNP (herniated nucleus pulposus), Hx of blood clots, Hyperlipidemia, OA (osteoarthritis), Retention of urine, and Scoliosis. PSH: Patient has a past surgical history that includes Hysterectomy (01/01/1979); Foot surgery (01/01/1999); Lumbar disc surgery (01/01/2008); Cataract removal (01/01/2010); rhinoplasty (01/01/1986); Upper gastrointestinal endoscopy (4/28/09,1/4/2013); Colonoscopy (05/12/2009); Upper gastrointestinal endoscopy (05/07/2014); Breast biopsy (Right, 04/27/2015); eye surgery; back surgery (11/2017); De Quervain's release (Right, 01/2015); and laminectomy (N/A, 10/4/2023). Social History: Patient reports that she quit smoking about 32 years ago. Her smoking use included cigarettes.  She has never used

## 2023-10-05 NOTE — PLAN OF CARE
Problem: Discharge Planning  Goal: Discharge to home or other facility with appropriate resources  10/5/2023 0950 by Ilana Carter RN  Outcome: Progressing  10/5/2023 0122 by Kishore Lugo RN  Outcome: Progressing  Flowsheets (Taken 10/4/2023 2010)  Discharge to home or other facility with appropriate resources:   Identify barriers to discharge with patient and caregiver   Arrange for needed discharge resources and transportation as appropriate   Identify discharge learning needs (meds, wound care, etc)     Problem: Pain  Goal: Verbalizes/displays adequate comfort level or baseline comfort level  10/5/2023 0950 by Ilana Carter RN  Outcome: Progressing  10/5/2023 0122 by Kishore Lugo RN  Outcome: Progressing  Flowsheets (Taken 10/4/2023 1939)  Verbalizes/displays adequate comfort level or baseline comfort level: Encourage patient to monitor pain and request assistance     Problem: ABCDS Injury Assessment  Goal: Absence of physical injury  10/5/2023 0950 by Ilana Carter RN  Outcome: Progressing  10/5/2023 0122 by Kishore Lugo RN  Outcome: Progressing     Problem: Safety - Adult  Goal: Free from fall injury  10/5/2023 0950 by Ilana Carter RN  Outcome: Progressing  10/5/2023 0122 by Kishore Lugo RN  Outcome: Progressing

## 2023-10-06 VITALS
HEIGHT: 61 IN | TEMPERATURE: 98.1 F | OXYGEN SATURATION: 98 % | RESPIRATION RATE: 20 BRPM | DIASTOLIC BLOOD PRESSURE: 62 MMHG | HEART RATE: 64 BPM | BODY MASS INDEX: 23.79 KG/M2 | SYSTOLIC BLOOD PRESSURE: 147 MMHG | WEIGHT: 126 LBS

## 2023-10-06 PROCEDURE — 99231 SBSQ HOSP IP/OBS SF/LOW 25: CPT | Performed by: PHYSICIAN ASSISTANT

## 2023-10-06 PROCEDURE — 1210000000 HC MED SURG R&B

## 2023-10-06 PROCEDURE — 96361 HYDRATE IV INFUSION ADD-ON: CPT

## 2023-10-06 PROCEDURE — 2580000003 HC RX 258: Performed by: NEUROLOGICAL SURGERY

## 2023-10-06 PROCEDURE — 97116 GAIT TRAINING THERAPY: CPT

## 2023-10-06 RX ADMIN — SODIUM CHLORIDE, PRESERVATIVE FREE 10 ML: 5 INJECTION INTRAVENOUS at 09:54

## 2023-10-06 ASSESSMENT — ENCOUNTER SYMPTOMS: APNEA: 0

## 2023-10-06 NOTE — PROGRESS NOTES
Subjective:      Patient ID: Jalen Valenzuela is a 78 y.o. female    HPI 78year old female who is voiding independently with out incident. She voices no other urological complaints    Past Medical History:   Diagnosis Date    Allergic rhinitis     Arthritis     Chronic back pain     Diverticulosis 2009    Gastritis 2009    GERD (gastroesophageal reflux disease)     GI problem     HNP (herniated nucleus pulposus)     L SPINE    Hx of blood clots 2023    DVT left leg    Hyperlipidemia     OA (osteoarthritis)     Retention of urine     last back surgery (4 hours surgery) she could not urinate or have a bowel movement for multiple days    Scoliosis      Past Surgical History:   Procedure Laterality Date    BACK SURGERY  2017    Spinal fusion    BREAST BIOPSY Right 2015    needle biopsy-right breast; Nely TRAN M.D. CATARACT REMOVAL  2010    BOTH    COLONOSCOPY  2009    DR ZOHREH CHOI'S RELEASE Right 2015    trigger finger    EYE SURGERY      Ptosis-right eyelid; blepharoplasty-left eyelid    FOOT SURGERY  1999    HYSTERECTOMY (CERVIX STATUS UNKNOWN)  1979    LAMINECTOMY N/A 10/4/2023    L3-4  MICRODECOMPRESSION performed by Sydni Vizcarra MD at [address]  2008    lumbar microdiscectomy-DR. WILKINS    RHINOPLASTY  1986    UPPER GASTROINTESTINAL ENDOSCOPY  09,2013    DR BRUNER    UPPER GASTROINTESTINAL ENDOSCOPY  2014     Social History     Socioeconomic History    Marital status:      Spouse name: None    Number of children: None    Years of education: None    Highest education level: None   Tobacco Use    Smoking status: Former     Types: Cigarettes     Quit date: 1991     Years since quittin.7    Smokeless tobacco: Never   Vaping Use    Vaping Use: Never used   Substance and Sexual Activity    Alcohol use: Yes     Comment: rare occasions    Drug use: No     Family History   Problem Relation Genitourinary:  Negative for difficulty urinating and hematuria. Neurological:  Negative for speech difficulty. Objective:   Physical Exam     Assessment:      78year old female who is voiding independently with out incident.  She voices no other urological complaints        Plan:      Monitor for urinary retention        Sherry Alberto PA-C

## 2023-10-06 NOTE — PROGRESS NOTES
Hospitalist Daily Progress Note  Name: Mahin Perez  Age: 78 y.o. Gender: female  CodeStatus: Full Code  Allergies: Latex  Formaldehyde    Chief Complaint:No chief complaint on file. Primary Care Provider: Santiago Humphrey DO    InpatientTreatment Team: Treatment Team: Attending Provider: Ellie Gibson MD; Surgeon: Ellie Gibson MD; Consulting Physician: Eligio Abdi DO; Consulting Physician: Esha Carrasco MD; Consulting Physician: Suha Membreno MD; Patient Care Tech: [facility]; Utilization Reviewer: Merari Lou RN; Tech: Junella Plan; Registered Nurse: Gary Duarte RN    Admission Date: 10/4/2023      Subjective: No chest pain, sob, nausea. Back pain well controlled. Physical Exam  Vitals and nursing note reviewed. Constitutional:       Appearance: Normal appearance. Cardiovascular:      Rate and Rhythm: Normal rate and regular rhythm. Pulmonary:      Effort: Pulmonary effort is normal.      Breath sounds: Normal breath sounds. Abdominal:      General: Bowel sounds are normal.      Palpations: Abdomen is soft. Musculoskeletal:         General: Normal range of motion. Skin:     General: Skin is warm and dry. Neurological:      Mental Status: She is alert and oriented to person, place, and time. Mental status is at baseline. Medications:  Reviewed    Infusion Medications:    sodium chloride 100 mL/hr at 10/05/23 0315    sodium chloride       Scheduled Medications:    sodium chloride flush  5-40 mL IntraVENous 2 times per day    acetaminophen  650 mg Oral Q6H    bisacodyl  5 mg Oral Daily    ketorolac  15 mg IntraVENous Q6H    [START ON 10/7/2023] apixaban  5 mg Oral BID     PRN Meds: sodium chloride flush, sodium chloride, oxyCODONE **OR** oxyCODONE, HYDROmorphone **OR** HYDROmorphone, ondansetron, magnesium hydroxide, polyethylene glycol    Labs:   No results for input(s): \"WBC\", \"HGB\", \"HCT\", \"PLT\" in the last 72 hours.   Recent Labs

## 2023-10-06 NOTE — PROGRESS NOTES
Physical Therapy Med Surg Daily Treatment Note  Facility/Department: [facility]  Room: [facility]/P713-09       NAME: Nilda Rg  : 1940 (78 y.o.)  MRN: [id number]  CODE STATUS: Full Code    Date of Service: 10/6/2023    Patient Diagnosis(es): Lumbar stenosis with neurogenic claudication [M48.062]  Spinal stenosis, lumbar region, with neurogenic claudication [M48.062]   No chief complaint on file.     Patient Active Problem List    Diagnosis Date Noted    Urinary retention 10/05/2023    Pain 10/04/2023    Spinal stenosis, lumbar region, with neurogenic claudication 10/04/2023    Lumbar spondylosis 2023    Peroneal tendinitis of left lower leg 2023    Atherosclerotic peripheral vascular disease with intermittent claudication ([address]) 2023    History of lumbar fusion 2023    Lumbar stenosis with neurogenic claudication 2023    Thrombophlebitis 05/10/2023    Essential tremor 05/10/2023    Thrombophlebitis of superficial veins of right lower extremity 2018    Acquired spondylolisthesis of lumbosacral region 10/05/2017    HNP (herniated nucleus pulposus), lumbar 10/05/2017    Neurogenic claudication due to lumbar spinal stenosis 10/03/2017    Piriformis syndrome 2017    Lumbosacral stenosis 2017    Lumbar radiculopathy 2017    Allergic rhinitis 2012    GERD (gastroesophageal reflux disease)     Scoliosis     Hyperlipidemia     OA (osteoarthritis)     HNP (herniated nucleus pulposus)     Diverticulosis     Gastritis     Cataracts, both eyes         Past Medical History:   Diagnosis Date    Allergic rhinitis     Arthritis     Chronic back pain     Diverticulosis 2009    Gastritis 2009    GERD (gastroesophageal reflux disease)     GI problem     HNP (herniated nucleus pulposus)     L SPINE    Hx of blood clots 2023    DVT left leg    Hyperlipidemia     OA (osteoarthritis)     Retention of urine     last back surgery (4 hours surgery) she could not urinate or have a bowel movement for multiple days    Scoliosis      Past Surgical History:   Procedure Laterality Date    BACK SURGERY  11/2017    Spinal fusion    BREAST BIOPSY Right 04/27/2015    needle biopsy-right breast; Trena TRAN M.D. CATARACT REMOVAL  01/01/2010    BOTH    COLONOSCOPY  05/12/2009    DR ZOHREH CHOI'S RELEASE Right 01/2015    trigger finger    EYE SURGERY      Ptosis-right eyelid; blepharoplasty-left eyelid    FOOT SURGERY  01/01/1999    HYSTERECTOMY (CERVIX STATUS UNKNOWN)  01/01/1979    LAMINECTOMY N/A 10/4/2023    L3-4  MICRODECOMPRESSION performed by Martin Billingsley MD at [address]  01/01/2008    lumbar microdiscectomy-DR. WILKINS    RHINOPLASTY  01/01/1986    UPPER GASTROINTESTINAL ENDOSCOPY  4/28/09,1/4/2013    DR BRUNER    UPPER GASTROINTESTINAL ENDOSCOPY  05/07/2014            Restrictions:  Restrictions/Precautions: Up as Tolerated    SUBJECTIVE:   Subjective: I walked all the way around last night with the student nurse. Pain  Pain: 0/10 Pre and post.      OBJECTIVE:        Bed mobility  Rolling to Left: Modified independent  Rolling to Right: Modified independent  Supine to Sit: Modified independent  Sit to Supine: Modified independent  Scooting: Modified independent  Bed Mobility Comments: reminders to log roll. Transfers  Sit to Stand: Modified independent  Stand to Sit: Modified independent  Bed to Chair: Modified independent    Ambulation  Surface: Level tile  Device: No Device  Assistance: Modified Independent  Quality of Gait: Steady gait. Gait Deviations: Slow Merary; Increased DAWSON  Distance: 200' with turns              Neuromuscular Education  Facilitation techniques: TUG tested  Outcomes Measures:  Timed Up and Go: 10                              ASSESSMENT   Body Structures, Functions, Activity Limitations Requiring Skilled Therapeutic Intervention: Decreased functional mobility ; Decreased endurance;Decreased balance; Increased

## 2023-10-06 NOTE — DISCHARGE INSTR - DIET

## 2023-10-06 NOTE — DISCHARGE SUMMARY
Discharge Summary     Date:10/6/2023        Patient Name:Yamini Calle     Date of Birth:[birth year]     Age:83 y.o. Admit Date:10/4/2023   Admission Condition:fair   Discharged Condition:good  Discharge Date: 10/06/23     Discharge Diagnoses   Principal Problem:    Lumbar stenosis with neurogenic claudication  Active Problems:    Urinary retention    GERD (gastroesophageal reflux disease)    Hyperlipidemia    Gastritis    Neurogenic claudication due to lumbar spinal stenosis    Pain    Spinal stenosis, lumbar region, with neurogenic claudication  Resolved Problems:    * No resolved hospital problems. [facility] Stay   Narrative of Hospital Course: With successful removal of the Martel and independent voiding. Ambulating independently with minimal aid. Wound healing well. Appreciate consultations from physical medicine rehab hospitalist  worker. Urologist did see the patient. Consultants:   IP CONSULT TO PHYSICAL MEDICINE REHAB  IP CONSULT TO HOSPITALIST  IP CONSULT TO CASE MANAGEMENT  IP CONSULT TO SOCIAL WORK  IP CONSULT TO UROLOGY    Time Spent on Discharge:  15 minutes were spent in patient examination, evaluation, counseling as well as medication reconciliation, prescriptions for required medications, discharge plan and follow up.       Surgeries/Procedures Performed:  Procedure(s):  L3-4  MICRODECOMPRESSION          Significant Diagnostic Studies:   Recent Labs:  CBC: No results found for: \"WBC\", \"RBC\", \"HGB\", \"HCT\", \"MCV\", \"MCH\", \"MCHC\", \"RDW\", \"PLT\"  BMP:    Lab Results   Component Value Date/Time    CREATININE 0.88 10/05/2023 02:03 PM    LABGLOM >60.0 10/05/2023 02:03 PM     HFP:  No results found for: \"ALB\", \"PROT\"  CMP:    Lab Results   Component Value Date/Time    CREATININE 0.88 10/05/2023 02:03 PM    LABGLOM >60.0 10/05/2023 02:03 PM     PT/INR:    Lab Results   Component Value Date/Time    PROTIME 13.5 09/29/2023 02:00 PM    INR 1.0 09/29/2023 02:00 PM     PTT:   Lab

## 2023-10-08 NOTE — PROGRESS NOTES
Physical Therapy  Facility/Department: [facility] EDJX R988/T509-55  Physical Therapy Discharge      NAME: Ge Bello    : 1940 (78 y.o.)  MRN: [id number]    Account: [de-identified]  Gender: female      Patient has been discharged from acute care hospital. DC patient from current PT program.      Electronically signed by Yakov Daley PT on 10/8/23 at 11:08 AM EDT

## 2023-10-19 ENCOUNTER — OFFICE VISIT (OUTPATIENT)
Dept: PAIN MANAGEMENT | Age: 83
End: 2023-10-19

## 2023-10-19 VITALS
BODY MASS INDEX: 23.79 KG/M2 | WEIGHT: 126 LBS | DIASTOLIC BLOOD PRESSURE: 70 MMHG | HEIGHT: 61 IN | SYSTOLIC BLOOD PRESSURE: 124 MMHG

## 2023-10-19 DIAGNOSIS — Z48.89 ENCOUNTER FOR POST SURGICAL WOUND CHECK: ICD-10-CM

## 2023-10-19 DIAGNOSIS — M48.062 SPINAL STENOSIS, LUMBAR REGION, WITH NEUROGENIC CLAUDICATION: Primary | ICD-10-CM

## 2023-10-19 PROCEDURE — 99024 POSTOP FOLLOW-UP VISIT: CPT | Performed by: NURSE PRACTITIONER

## 2023-10-19 NOTE — PROGRESS NOTES
S/P L3-4  MICRODECOMPRESSION on 10/4/23 with Dr Kayden Franco.  No pain, leg better  Walks without assistive device and gets in and out of chair easily    Pain meds none  Incision approximated, scabs, no signs of infection      PLAN: declines PT

## 2023-10-25 NOTE — PROGRESS NOTES
Patient Name: Corina Mcgill : 1940        Date: 2023      Type of Appt: Post op    Reason for appt: POST-OP  10/4/23  L3-4  MICRODECOMPRESSION    Pt last seen by Dr Mirian Moran on 23- In office, 10/4/23- Surgery     Surgeries: 10/4/23  L3-4  MICRODECOMPRESSION BY DR. WILKINS     [facility] 2017 left L4-5 microdissection decompression interbody cage posterior lateral fusion pedicle screws with Dr[facility] [address], [address] [place] [address], [address] [place] [address] [place]: [phone]  F: [phone]      Patient: Corina Mcgill  YOB: 1940  Date: 2023    The patient is a 78 y.o. female who presents today for follow up. She has had resolution of all of her pain is active up and around and did not even need to physical therapy. Wound is well-healed. Patient was shown her MRI lumbar spine at [facility] to answer all of her questions. All questions were answered about the procedure what was done the healing process activities and so forth. She has any other issues or questions she will contact the office. Wants to go back to [facility]. It was discussed.       Michael Danielson MD

## 2023-11-02 ENCOUNTER — OFFICE VISIT (OUTPATIENT)
Dept: NEUROSURGERY | Age: 83
End: 2023-11-02

## 2023-11-02 VITALS — BODY MASS INDEX: 23.79 KG/M2 | TEMPERATURE: 97.1 F | HEIGHT: 61 IN | WEIGHT: 126 LBS

## 2023-11-02 DIAGNOSIS — M48.062 LUMBAR STENOSIS WITH NEUROGENIC CLAUDICATION: Primary | ICD-10-CM

## 2023-11-02 PROCEDURE — 99024 POSTOP FOLLOW-UP VISIT: CPT | Performed by: NEUROLOGICAL SURGERY

## 2023-11-03 PROBLEM — R52 PAIN: Status: RESOLVED | Noted: 2023-10-04 | Resolved: 2023-11-03

## 2024-02-23 ENCOUNTER — DOCUMENTATION (OUTPATIENT)
Dept: SPEECH THERAPY | Facility: CLINIC | Age: 84
End: 2024-02-23
Payer: MEDICARE

## 2024-02-23 NOTE — PROGRESS NOTES
Speech-Language Pathology    Discharge Summary    Name: Amber Carrington  MRN: [id number]  : 1940  Date: 24    Discharge Summary: SLP    Discharge Information: Date of discharge 24, Date of last visit 23, Date of evaluation 23, and Number of attended visits 2    Therapy Summary: Patient completed voice evaluation on 23, and then returned to clinic for one follow-up visit. Patient was given home program, and was encouraged to follow up in clinic in 2-3 weeks.    Rehab Discharge Reason: No further follow-up visits scheduled.

## 2024-03-19 DIAGNOSIS — Z86.718 H/O DEEP VENOUS THROMBOSIS: ICD-10-CM

## 2024-03-19 NOTE — TELEPHONE ENCOUNTER
Requested Prescriptions     Pending Prescriptions Disp Refills    apixaban (Eliquis) 5 mg tablet 180 tablet 1     Sig: Take 1 tablet (5 mg) by mouth 2 times a day.

## 2024-03-20 ENCOUNTER — TELEPHONE (OUTPATIENT)
Dept: PRIMARY CARE | Facility: CLINIC | Age: 84
End: 2024-03-20
Payer: MEDICARE

## 2024-03-20 DIAGNOSIS — Z86.718 H/O DEEP VENOUS THROMBOSIS: ICD-10-CM

## 2024-03-20 NOTE — TELEPHONE ENCOUNTER
PT needs refill for...      apixaban (Eliquis) 5 mg tablet         Please send to [facility]    Thank you

## 2024-05-13 ENCOUNTER — OFFICE VISIT (OUTPATIENT)
Dept: PRIMARY CARE | Facility: CLINIC | Age: 84
End: 2024-05-13
Payer: MEDICARE

## 2024-05-13 VITALS
TEMPERATURE: 98 F | OXYGEN SATURATION: 98 % | HEART RATE: 86 BPM | BODY MASS INDEX: 24.7 KG/M2 | WEIGHT: 130.8 LBS | HEIGHT: 61 IN | SYSTOLIC BLOOD PRESSURE: 114 MMHG | RESPIRATION RATE: 19 BRPM | DIASTOLIC BLOOD PRESSURE: 68 MMHG

## 2024-05-13 DIAGNOSIS — Z00.00 ROUTINE GENERAL MEDICAL EXAMINATION AT HEALTH CARE FACILITY: Primary | ICD-10-CM

## 2024-05-13 DIAGNOSIS — K57.90 DIVERTICULOSIS: ICD-10-CM

## 2024-05-13 DIAGNOSIS — Z86.718 H/O DEEP VENOUS THROMBOSIS: ICD-10-CM

## 2024-05-13 DIAGNOSIS — Z13.29 SCREENING FOR THYROID DISORDER: ICD-10-CM

## 2024-05-13 DIAGNOSIS — Z71.89 GOALS OF CARE, COUNSELING/DISCUSSION: ICD-10-CM

## 2024-05-13 DIAGNOSIS — E78.2 MODERATE MIXED HYPERLIPIDEMIA NOT REQUIRING STATIN THERAPY: ICD-10-CM

## 2024-05-13 DIAGNOSIS — Z12.31 ENCOUNTER FOR SCREENING MAMMOGRAM FOR BREAST CANCER: ICD-10-CM

## 2024-05-13 DIAGNOSIS — M48.062 NEUROGENIC CLAUDICATION DUE TO LUMBAR SPINAL STENOSIS: ICD-10-CM

## 2024-05-13 DIAGNOSIS — K21.9 GASTROESOPHAGEAL REFLUX DISEASE WITHOUT ESOPHAGITIS: ICD-10-CM

## 2024-05-13 DIAGNOSIS — G25.0 ESSENTIAL TREMOR: ICD-10-CM

## 2024-05-13 DIAGNOSIS — I82.432 ACUTE EMBOLISM AND THROMBOSIS OF LEFT POPLITEAL VEIN (MULTI): ICD-10-CM

## 2024-05-13 DIAGNOSIS — Z78.0 ASYMPTOMATIC MENOPAUSAL STATE: ICD-10-CM

## 2024-05-13 PROBLEM — M81.0 AGE-RELATED OSTEOPOROSIS WITHOUT CURRENT PATHOLOGICAL FRACTURE: Status: ACTIVE | Noted: 2024-05-13

## 2024-05-13 PROBLEM — I80.9 THROMBOPHLEBITIS: Status: RESOLVED | Noted: 2023-05-10 | Resolved: 2024-05-13

## 2024-05-13 PROBLEM — G57.00 PIRIFORMIS SYNDROME: Status: RESOLVED | Noted: 2017-08-08 | Resolved: 2024-05-13

## 2024-05-13 PROCEDURE — 1123F ACP DISCUSS/DSCN MKR DOCD: CPT | Performed by: NURSE PRACTITIONER

## 2024-05-13 PROCEDURE — 1159F MED LIST DOCD IN RCRD: CPT | Performed by: NURSE PRACTITIONER

## 2024-05-13 PROCEDURE — 1157F ADVNC CARE PLAN IN RCRD: CPT | Performed by: NURSE PRACTITIONER

## 2024-05-13 PROCEDURE — 1170F FXNL STATUS ASSESSED: CPT | Performed by: NURSE PRACTITIONER

## 2024-05-13 PROCEDURE — 1036F TOBACCO NON-USER: CPT | Performed by: NURSE PRACTITIONER

## 2024-05-13 PROCEDURE — G0439 PPPS, SUBSEQ VISIT: HCPCS | Performed by: NURSE PRACTITIONER

## 2024-05-13 PROCEDURE — 1158F ADVNC CARE PLAN TLK DOCD: CPT | Performed by: NURSE PRACTITIONER

## 2024-05-13 RX ORDER — ACETAMINOPHEN 500 MG
2 TABLET ORAL DAILY
COMMUNITY
Start: 2023-08-14

## 2024-05-13 ASSESSMENT — PATIENT HEALTH QUESTIONNAIRE - PHQ9
2. FEELING DOWN, DEPRESSED OR HOPELESS: NOT AT ALL
1. LITTLE INTEREST OR PLEASURE IN DOING THINGS: NOT AT ALL
SUM OF ALL RESPONSES TO PHQ9 QUESTIONS 1 AND 2: 0

## 2024-05-13 ASSESSMENT — ACTIVITIES OF DAILY LIVING (ADL)
TAKING_MEDICATION: INDEPENDENT
BATHING: INDEPENDENT
DOING_HOUSEWORK: INDEPENDENT
DRESSING: INDEPENDENT
MANAGING_FINANCES: INDEPENDENT
GROCERY_SHOPPING: INDEPENDENT

## 2024-05-13 ASSESSMENT — ENCOUNTER SYMPTOMS
FEVER: 0
WEAKNESS: 0
PALPITATIONS: 0
TROUBLE SWALLOWING: 0
HEADACHES: 0
ABDOMINAL PAIN: 0
BACK PAIN: 1
SINUS PRESSURE: 0
FATIGUE: 0
DIARRHEA: 0
TREMORS: 1
COUGH: 0
SHORTNESS OF BREATH: 0
CONSTIPATION: 0

## 2024-05-13 NOTE — ASSESSMENT & PLAN NOTE
This is very mild. Does not require statin therapy.  You do not need medication for that at this time. However, you should assure you are getting regular exercise, which should include 150 minutes of moderate intensity exercise (like a brisk walk) every week and strength training exercise at least 2-3 times per week.  You should also limit trans fats and alcohol in your diet. It is advised that you increase fiber in your diet, as well as increasing fruits and vegetables, eat fish 1-2 times per week, and limit yourself to only lean meats if you eat animal products.

## 2024-05-13 NOTE — ASSESSMENT & PLAN NOTE
Advised patient to bring in her Living Will for the chart. She states she has spoken with a  and that she has some kind of DNR information on her refrigerator. However, she does not fully understand what that means. She expresses that she DOES want to be resuscitated if her heart or breathing were to stop, but she would not want to be kept alive by artificial means indefinitely and does not want to be in a nursing home.   Pt. Will remain a full code.

## 2024-05-13 NOTE — ASSESSMENT & PLAN NOTE
This occurred 16 months ago, and she has been on Eliquis for that entire time. Will discontinue Eliquis at this time. The patient is aware of signs and symptoms of DVT and is aware that if she has a 2nd clot, she will need lifelong anticoagulation at that time. However, at her advanced age and with risk of falls, the risk of anticoagulant therapy is greater than the potential benefits of continued DOAC therapy. Will discontinue Eliquis now.

## 2024-05-13 NOTE — PROGRESS NOTES
"Subjective   Reason for Visit: Amber Carrington is an 83 y.o. female here for a Medicare Wellness visit.     Past Medical, Surgical, and Family History reviewed and updated in chart.    Reviewed all medications by prescribing practitioner or clinical pharmacist (such as prescriptions, OTCs, herbal therapies and supplements) and documented in the medical record.    HPI    Patient Care Team:  BABATUNDE Elam-CNP as PCP - General (Gerontology)   Martin Cervantes MD (urology for tiny kidney stone)  Review of Systems   Constitutional:  Negative for fatigue and fever.   HENT:  Negative for sinus pressure and trouble swallowing.    Eyes:  Negative for visual disturbance.   Respiratory:  Negative for cough and shortness of breath.    Cardiovascular:  Negative for chest pain and palpitations.   Gastrointestinal:  Negative for abdominal pain, constipation and diarrhea.   Musculoskeletal:  Positive for back pain.   Neurological:  Positive for tremors. Negative for weakness and headaches.   Objective   Vitals:  /68   Pulse 86   Temp 36.7 °C (98 °F)   Resp 19   Ht 1.549 m (5' 1\")   Wt 59.3 kg (130 lb 12.8 oz)   LMP  (LMP Unknown)   SpO2 98%   BMI 24.71 kg/m²       Physical Exam  Vitals reviewed.   Constitutional:       Appearance: Normal appearance.   HENT:      Head: Normocephalic.   Eyes:      Conjunctiva/sclera: Conjunctivae normal.   Cardiovascular:      Rate and Rhythm: Normal rate and regular rhythm.      Pulses: Normal pulses.      Heart sounds: No murmur heard.  Pulmonary:      Effort: Pulmonary effort is normal.      Breath sounds: Normal breath sounds.   Abdominal:      General: Bowel sounds are normal.      Palpations: Abdomen is soft.   Musculoskeletal:      Cervical back: Neck supple.      Right lower leg: No edema.      Left lower leg: No edema.   Lymphadenopathy:      Cervical: No cervical adenopathy.   Skin:     General: Skin is warm and dry.   Neurological:      General: No focal deficit present. "      Mental Status: She is alert and oriented to person, place, and time.   Psychiatric:         Mood and Affect: Mood normal.         Thought Content: Thought content normal.     Assessment/Plan   Problem List Items Addressed This Visit       Diverticulosis    GERD (gastroesophageal reflux disease)    Current Assessment & Plan     No current complaints. Not taking any medication for this.         Relevant Orders    CBC    Comprehensive Metabolic Panel    Essential tremor    Hyperlipidemia    Current Assessment & Plan     This is very mild. Does not require statin therapy.  You do not need medication for that at this time. However, you should assure you are getting regular exercise, which should include 150 minutes of moderate intensity exercise (like a brisk walk) every week and strength training exercise at least 2-3 times per week.  You should also limit trans fats and alcohol in your diet. It is advised that you increase fiber in your diet, as well as increasing fruits and vegetables, eat fish 1-2 times per week, and limit yourself to only lean meats if you eat animal products.             Relevant Orders    Lipid Panel    Neurogenic claudication due to lumbar spinal stenosis    Goals of care, counseling/discussion    Current Assessment & Plan     Advised patient to bring in her Living Will for the chart. She states she has spoken with a  and that she has some kind of DNR information on her refrigerator. However, she does not fully understand what that means. She expresses that she DOES want to be resuscitated if her heart or breathing were to stop, but she would not want to be kept alive by artificial means indefinitely and does not want to be in a nursing home.   Pt. Will remain a full code.         Relevant Orders    Comprehensive Metabolic Panel    Vitamin B12    Vitamin D 1,25 Dihydroxy (for eval of hypercalcemia)    H/O deep venous thrombosis    Overview     1/2023. Left popliteal vein, unprovoked          Current Assessment & Plan     This occurred 16 months ago, and she has been on Eliquis for that entire time. Will discontinue Eliquis at this time. The patient is aware of signs and symptoms of DVT and is aware that if she has a 2nd clot, she will need lifelong anticoagulation at that time. However, at her advanced age and with risk of falls, the risk of anticoagulant therapy is greater than the potential benefits of continued DOAC therapy. Will discontinue Eliquis now.         RESOLVED: Acute embolism and thrombosis of left popliteal vein (Multi)     Other Visit Diagnoses       Routine general medical examination at health care facility    -  Primary    Relevant Orders    1 Year Follow Up In [facility] - PCP - Wellness Exam    Asymptomatic menopausal state        Relevant Orders    XR DEXA bone density    Encounter for screening mammogram for breast cancer        Relevant Orders    BI mammo bilateral screening tomosynthesis    Screening for thyroid disorder        Relevant Orders    TSH with reflex to Free T4 if abnormal

## 2024-07-18 ENCOUNTER — HOSPITAL ENCOUNTER (OUTPATIENT)
Dept: RADIOLOGY | Facility: CLINIC | Age: 84
Discharge: HOME | End: 2024-07-18
Payer: MEDICARE

## 2024-07-18 DIAGNOSIS — N20.0 CALCULUS OF KIDNEY: ICD-10-CM

## 2024-07-18 PROCEDURE — 76770 US EXAM ABDO BACK WALL COMP: CPT

## 2024-10-02 ENCOUNTER — APPOINTMENT (OUTPATIENT)
Dept: RADIOLOGY | Facility: CLINIC | Age: 84
End: 2024-10-02
Payer: MEDICARE

## 2024-10-02 ENCOUNTER — APPOINTMENT (OUTPATIENT)
Dept: RADIOLOGY | Facility: HOSPITAL | Age: 84
End: 2024-10-02
Payer: MEDICARE

## 2024-10-09 ENCOUNTER — APPOINTMENT (OUTPATIENT)
Dept: RADIOLOGY | Facility: CLINIC | Age: 84
End: 2024-10-09
Payer: MEDICARE

## 2024-10-16 ENCOUNTER — HOSPITAL ENCOUNTER (OUTPATIENT)
Dept: RADIOLOGY | Facility: CLINIC | Age: 84
Discharge: HOME | End: 2024-10-16
Payer: MEDICARE

## 2024-10-16 VITALS — WEIGHT: 126 LBS | HEIGHT: 61 IN | BODY MASS INDEX: 23.79 KG/M2

## 2024-10-16 DIAGNOSIS — Z12.31 ENCOUNTER FOR SCREENING MAMMOGRAM FOR BREAST CANCER: ICD-10-CM

## 2024-10-16 PROCEDURE — 77063 BREAST TOMOSYNTHESIS BI: CPT

## 2024-10-21 ENCOUNTER — HOSPITAL ENCOUNTER (EMERGENCY)
Facility: HOSPITAL | Age: 84
Discharge: HOME | End: 2024-10-21
Payer: MEDICARE

## 2024-10-21 ENCOUNTER — APPOINTMENT (OUTPATIENT)
Dept: CARDIOLOGY | Facility: HOSPITAL | Age: 84
End: 2024-10-21
Payer: MEDICARE

## 2024-10-21 VITALS
RESPIRATION RATE: 17 BRPM | DIASTOLIC BLOOD PRESSURE: 86 MMHG | TEMPERATURE: 98.6 F | HEART RATE: 68 BPM | BODY MASS INDEX: 23.79 KG/M2 | OXYGEN SATURATION: 99 % | HEIGHT: 61 IN | WEIGHT: 126 LBS | SYSTOLIC BLOOD PRESSURE: 187 MMHG

## 2024-10-21 DIAGNOSIS — R03.0 ELEVATED BLOOD PRESSURE READING: ICD-10-CM

## 2024-10-21 DIAGNOSIS — M79.605 PAIN IN LEFT LEG: ICD-10-CM

## 2024-10-21 DIAGNOSIS — I82.452 ACUTE DEEP VEIN THROMBOSIS (DVT) OF LEFT PERONEAL VEIN (MULTI): Primary | ICD-10-CM

## 2024-10-21 LAB
ALBUMIN SERPL BCP-MCNC: 4.3 G/DL (ref 3.4–5)
ALP SERPL-CCNC: 76 U/L (ref 33–136)
ALT SERPL W P-5'-P-CCNC: 12 U/L (ref 7–45)
ANION GAP SERPL CALC-SCNC: 12 MMOL/L (ref 10–20)
AST SERPL W P-5'-P-CCNC: 16 U/L (ref 9–39)
BASOPHILS # BLD AUTO: 0.05 X10*3/UL (ref 0–0.1)
BASOPHILS NFR BLD AUTO: 0.7 %
BILIRUB SERPL-MCNC: 0.5 MG/DL (ref 0–1.2)
BUN SERPL-MCNC: 17 MG/DL (ref 6–23)
CALCIUM SERPL-MCNC: 9.7 MG/DL (ref 8.6–10.3)
CHLORIDE SERPL-SCNC: 101 MMOL/L (ref 98–107)
CO2 SERPL-SCNC: 30 MMOL/L (ref 21–32)
CREAT SERPL-MCNC: 0.71 MG/DL (ref 0.5–1.05)
EGFRCR SERPLBLD CKD-EPI 2021: 83 ML/MIN/1.73M*2
EOSINOPHIL # BLD AUTO: 0.1 X10*3/UL (ref 0–0.4)
EOSINOPHIL NFR BLD AUTO: 1.4 %
ERYTHROCYTE [DISTWIDTH] IN BLOOD BY AUTOMATED COUNT: 13.2 % (ref 11.5–14.5)
GLUCOSE SERPL-MCNC: 92 MG/DL (ref 74–99)
HCT VFR BLD AUTO: 47.2 % (ref 36–46)
HGB BLD-MCNC: 15.3 G/DL (ref 12–16)
IMM GRANULOCYTES # BLD AUTO: 0.03 X10*3/UL (ref 0–0.5)
IMM GRANULOCYTES NFR BLD AUTO: 0.4 % (ref 0–0.9)
LYMPHOCYTES # BLD AUTO: 1.97 X10*3/UL (ref 0.8–3)
LYMPHOCYTES NFR BLD AUTO: 27.1 %
MCH RBC QN AUTO: 29.2 PG (ref 26–34)
MCHC RBC AUTO-ENTMCNC: 32.4 G/DL (ref 32–36)
MCV RBC AUTO: 90 FL (ref 80–100)
MONOCYTES # BLD AUTO: 0.73 X10*3/UL (ref 0.05–0.8)
MONOCYTES NFR BLD AUTO: 10.1 %
NEUTROPHILS # BLD AUTO: 4.38 X10*3/UL (ref 1.6–5.5)
NEUTROPHILS NFR BLD AUTO: 60.3 %
NRBC BLD-RTO: 0 /100 WBCS (ref 0–0)
PLATELET # BLD AUTO: 282 X10*3/UL (ref 150–450)
POTASSIUM SERPL-SCNC: 3.9 MMOL/L (ref 3.5–5.3)
PROT SERPL-MCNC: 7.5 G/DL (ref 6.4–8.2)
RBC # BLD AUTO: 5.24 X10*6/UL (ref 4–5.2)
SODIUM SERPL-SCNC: 139 MMOL/L (ref 136–145)
WBC # BLD AUTO: 7.3 X10*3/UL (ref 4.4–11.3)

## 2024-10-21 PROCEDURE — 85025 COMPLETE CBC W/AUTO DIFF WBC: CPT | Performed by: PHYSICIAN ASSISTANT

## 2024-10-21 PROCEDURE — 99284 EMERGENCY DEPT VISIT MOD MDM: CPT | Mod: 25

## 2024-10-21 PROCEDURE — 36415 COLL VENOUS BLD VENIPUNCTURE: CPT | Performed by: PHYSICIAN ASSISTANT

## 2024-10-21 PROCEDURE — 84075 ASSAY ALKALINE PHOSPHATASE: CPT | Performed by: PHYSICIAN ASSISTANT

## 2024-10-21 PROCEDURE — 93971 EXTREMITY STUDY: CPT

## 2024-10-21 PROCEDURE — 93971 EXTREMITY STUDY: CPT | Performed by: SURGERY

## 2024-10-21 RX ORDER — ACETAMINOPHEN 500 MG
1 TABLET ORAL 3 TIMES DAILY
Qty: 1 KIT | Refills: 0 | Status: SHIPPED | OUTPATIENT
Start: 2024-10-21 | End: 2024-11-20

## 2024-10-21 ASSESSMENT — COLUMBIA-SUICIDE SEVERITY RATING SCALE - C-SSRS
2. HAVE YOU ACTUALLY HAD ANY THOUGHTS OF KILLING YOURSELF?: NO
1. IN THE PAST MONTH, HAVE YOU WISHED YOU WERE DEAD OR WISHED YOU COULD GO TO SLEEP AND NOT WAKE UP?: NO
6. HAVE YOU EVER DONE ANYTHING, STARTED TO DO ANYTHING, OR PREPARED TO DO ANYTHING TO END YOUR LIFE?: NO

## 2024-10-21 ASSESSMENT — PAIN SCALES - GENERAL: PAINLEVEL_OUTOF10: 0 - NO PAIN

## 2024-10-21 ASSESSMENT — PAIN - FUNCTIONAL ASSESSMENT: PAIN_FUNCTIONAL_ASSESSMENT: 0-10

## 2024-10-21 NOTE — DISCHARGE INSTRUCTIONS
Take 10 mg (2 5 mg tablets) of eliquis twice daily for the first 7 days (4 tablets total per day)  After 7 days, you can start taking 1, 5 mg tablet twice daily (2 tablets total per day)

## 2024-10-21 NOTE — ED TRIAGE NOTES
"Patient states,\" I woke up with a lump to left lower calf area\", HX DVT. Pt denies sob, chest pains, dizziness, fever, chills. No redness @ site.  "

## 2024-10-21 NOTE — ED PROVIDER NOTES
Emergency Department Provider Note        History of Present Illness     History provided by: Patient  Limitations to History: None  External Records Reviewed with Brief Summary:  pmhx    HPI:  Patient is an 84-year-old female with history of osteoarthritis, history of DVT, hyperlipidemia, GERD, back pain who presents today for evaluation of a lump in her left calf.  Patient states she woke up this morning with it, states that it scared her because January of last year she had a DVT in the same spot.  Patient states she was on blood thinners for about a year and a half, she states that they never found a cause for her DVT.  She states that May of this year her primary care doctor took her off of her blood thinner.  Patient states that initially when she had the blood clot she waited a couple days to go in and her whole leg became red and swollen.  Patient states that this has not yet happened.  She denies any pain associated with it.  Denies chest pain shortness of breath, hemoptysis, history of malignancy, hormone use, recent surgeries hospitalizations or travel.  Denies history of trauma to the area.    Physical Exam   Triage vitals:  T 37 °C (98.6 °F)  HR 90  /81  RR 18  O2 97 % None (Room air)    Physical Exam  Vitals and nursing note reviewed.   Constitutional:       General: She is not in acute distress.     Appearance: Normal appearance. She is not toxic-appearing.   HENT:      Head: Normocephalic and atraumatic.      Nose: Nose normal.   Eyes:      Extraocular Movements: Extraocular movements intact.   Cardiovascular:      Rate and Rhythm: Normal rate and regular rhythm.   Pulmonary:      Effort: Pulmonary effort is normal.   Abdominal:      Palpations: Abdomen is soft.   Musculoskeletal:         General: Normal range of motion.      Cervical back: Normal range of motion and neck supple.        Legs:       Comments:     Nontender raised area consistent with varicose veins present to left lower  lateral calf.  No warmth or redness, distally neurovascularly intact.     Skin:     General: Skin is warm and dry.   Neurological:      General: No focal deficit present.      Mental Status: She is alert.   Psychiatric:         Mood and Affect: Mood normal.         Thought Content: Thought content normal.        Lower extremity venous duplex left   Final Result            Medical Decision Making & ED Course   Medical Decision Making:    MDM: Patient is an 84-year-old female who presents today for a lump to her left lower calf, see above HPI and physical exam, I did order an ultrasound to rule out DVT given her previous history. Patient's ultrasound did reveal an age-indeterminate peroneal vein DVT in addition to some chronic looking DVTs.  Given that patient is having new symptoms and there was no clear reason for why she had her previous DVT, feel that the benefits outweigh the risks of treating this as an acute DVT given that it is age-indeterminate.  I did order a CBC and CMP, she is not anemic, renal function and LFTs are within normal limits.  Patient be discharged with a prescription for Eliquis.  Patient states she actually has Eliquis at home, she brought up 2 bottles 1 of which has 60 tablets and another 1 which still has approximately 10 tablets in it.  She is wondering if she can take these, they do not  until .  I did write her a prescription for starting pack however patient was also educated on the correct dosing for Eliquis which is 10 mg twice daily for a week followed by 5 mg twice daily afterwards.  Patient expressed understanding with the instructions.  Upon discharge her blood pressure read was still elevated, blood pressure 187/86.  When I spoke with patient about this she has no symptoms whatsoever no chest pain shortness of breath abdominal pain headaches or blurred vision.  She states she feels just fine.  She states that she always has a high blood pressure reading in the ER but  then goes to her family doctor's office and it is 120/70.  Patient was agreeable to me prescribing her blood pressure cuff and encouraging her to check her blood pressures at home for when she follows up with her family doctor in about 3 weeks to see if she has hypertension and if she needs to be on medications for this.  Blood pressure cuff prescription was provided and patient is stable for discharge at this time.  Patient did refuse a first dose of Eliquis here, states that the last time they gave her a dose in the hospital to cost her almost $70 out-of-pocket, she wishes to avoid this charge at this time but promises she will take the 10 mg of Eliquis as soon as she leaves here.  ----      Differential diagnoses considered include but are not limited to: DVT, varicose veins, thrombophlebitis     Social Determinants of Health which Significantly Impact Care: None identified     EKG Independent Interpretation: EKG interpreted by myself. Please see ED Course for full interpretation.    Independent Result Review and Interpretation: Relevant laboratory and radiographic results were reviewed and independently interpreted by myself.  As necessary, they are commented on in the ED Course.    Chronic conditions affecting the patient's care: As documented above in MDM    The patient was discussed with the following consultants/services: None    Care Considerations: As documented above in [facility]    ED Course:  Diagnoses as of 10/21/24 1315   Acute deep vein thrombosis (DVT) of left peroneal vein (Multi)   Elevated blood pressure reading     Disposition   As a result of the work-up, the patient was discharged home.  she was informed of her diagnosis and instructed to come back with any concerns or worsening of condition.  she and was agreeable to the plan as discussed above.  she was given the opportunity to ask questions.  All of the patient's questions were answered.    Procedures   Procedures    Patient was seen  independently    Erica Sanchez PA-C  Emergency Medicine       Erica Sanchez PA-C  10/21/24 5730

## 2024-11-13 ENCOUNTER — APPOINTMENT (OUTPATIENT)
Dept: PRIMARY CARE | Facility: CLINIC | Age: 84
End: 2024-11-13
Payer: MEDICARE

## 2024-11-13 VITALS
DIASTOLIC BLOOD PRESSURE: 62 MMHG | WEIGHT: 132 LBS | HEART RATE: 84 BPM | OXYGEN SATURATION: 97 % | BODY MASS INDEX: 24.92 KG/M2 | SYSTOLIC BLOOD PRESSURE: 110 MMHG | RESPIRATION RATE: 18 BRPM | HEIGHT: 61 IN

## 2024-11-13 DIAGNOSIS — I82.552 CHRONIC DEEP VEIN THROMBOSIS (DVT) OF LEFT PERONEAL VEIN (MULTI): Primary | ICD-10-CM

## 2024-11-13 DIAGNOSIS — M19.072 PRIMARY OSTEOARTHRITIS OF LEFT FOOT: ICD-10-CM

## 2024-11-13 PROBLEM — G24.3 CERVICAL DYSTONIA: Status: ACTIVE | Noted: 2024-11-13

## 2024-11-13 PROBLEM — Z86.79 HISTORY OF HYPOTENSION: Status: ACTIVE | Noted: 2024-11-13

## 2024-11-13 PROBLEM — I80.9 THROMBOPHLEBITIS: Status: RESOLVED | Noted: 2023-01-26 | Resolved: 2024-11-13

## 2024-11-13 PROBLEM — R29.818 NEUROGENIC CLAUDICATION: Status: ACTIVE | Noted: 2023-07-27

## 2024-11-13 PROBLEM — I25.10 CORONARY ARTERY DISEASE: Status: ACTIVE | Noted: 2017-10-31

## 2024-11-13 PROBLEM — Z98.1 HISTORY OF LUMBAR FUSION: Status: ACTIVE | Noted: 2023-08-08

## 2024-11-13 PROBLEM — R49.8 VOCAL TREMOR: Status: ACTIVE | Noted: 2024-11-13

## 2024-11-13 PROBLEM — M22.40 CHONDROMALACIA OF PATELLA: Status: ACTIVE | Noted: 2022-02-15

## 2024-11-13 PROBLEM — I70.219: Status: ACTIVE | Noted: 2023-08-08

## 2024-11-13 PROBLEM — M47.816 LUMBAR SPONDYLOSIS: Status: ACTIVE | Noted: 2023-09-05

## 2024-11-13 PROBLEM — M48.061 SPINAL STENOSIS OF LUMBAR REGION: Status: ACTIVE | Noted: 2017-08-08

## 2024-11-13 PROBLEM — M76.72 PERONEAL TENDINITIS OF LEFT LOWER LEG: Status: ACTIVE | Noted: 2023-09-05

## 2024-11-13 PROBLEM — R10.9 FLANK PAIN: Status: RESOLVED | Noted: 2022-11-23 | Resolved: 2024-11-13

## 2024-11-13 PROBLEM — M76.00 GLUTEAL TENDINITIS: Status: RESOLVED | Noted: 2023-11-30 | Resolved: 2024-11-13

## 2024-11-13 PROBLEM — K29.70 GASTRITIS: Status: RESOLVED | Noted: 2024-11-13 | Resolved: 2024-11-13

## 2024-11-13 PROBLEM — I82.402 ACUTE THROMBOEMBOLISM OF DEEP VEINS OF LEFT LOWER EXTREMITY (MULTI): Status: RESOLVED | Noted: 2024-11-13 | Resolved: 2024-11-13

## 2024-11-13 PROCEDURE — 1159F MED LIST DOCD IN RCRD: CPT | Performed by: NURSE PRACTITIONER

## 2024-11-13 PROCEDURE — 1157F ADVNC CARE PLAN IN RCRD: CPT | Performed by: NURSE PRACTITIONER

## 2024-11-13 PROCEDURE — 1036F TOBACCO NON-USER: CPT | Performed by: NURSE PRACTITIONER

## 2024-11-13 PROCEDURE — 99214 OFFICE O/P EST MOD 30 MIN: CPT | Performed by: NURSE PRACTITIONER

## 2024-11-13 RX ORDER — DICLOFENAC SODIUM 10 MG/G
2 GEL TOPICAL 4 TIMES DAILY
Qty: 350 G | Refills: 3 | Status: SHIPPED | OUTPATIENT
Start: 2024-11-13

## 2024-11-13 ASSESSMENT — ENCOUNTER SYMPTOMS
NAUSEA: 0
PALPITATIONS: 0
FEVER: 0
ARTHRALGIAS: 1
VOMITING: 0
CONSTIPATION: 0
BLOOD IN STOOL: 0
ABDOMINAL PAIN: 0
DIARRHEA: 0
SHORTNESS OF BREATH: 0

## 2024-11-13 NOTE — ASSESSMENT & PLAN NOTE
Reviewed results of venous duplex scan of LLE from 10/21/2024.   Patient's ultrasound did reveal an age-indeterminate peroneal vein DVT in addition to some chronic looking DVTs. I suspect these were all due to her known prior DVT since she had no new symptoms except for pain in the area of the peroneal vein. She does have arthritic pain in her left foot and walks on the outside portion of her foot, which is evident by the wear pattern of the shoes. That is likely the cause for the pain in the area.  With that being said, given that her initial DVT in 1/2023 was unprovoked and pt has had no falls, will continue Eliquis 5 mg BID indefinitely.

## 2024-11-13 NOTE — PROGRESS NOTES
"Subjective   Amber Carrington is a 84 y.o. female who presents for Hospital Follow-up. Patient was at [facility] on 10/21/2024 for Acute Deep Vein Thrombosis of the peroneal vein. Patient states she woke up with a \"funny\" pain feeling in her leg and was worried due to a blood clot in the past. Patient notes that she stopped taking Eliquis back in May per her office visit with Shawnee Dominguez. Patient was advised that this may have been an old clot giving her issues and was advised to follow-up with her PCP. Patient has started taking her Eliquis again since this ER visit.    HPI  Review of Systems   Constitutional:  Negative for fever.   Respiratory:  Negative for shortness of breath.    Cardiovascular:  Negative for chest pain, palpitations and leg swelling.   Gastrointestinal:  Negative for abdominal pain, blood in stool, constipation, diarrhea, nausea and vomiting.   Musculoskeletal:  Positive for arthralgias (left foot arthritis in toes).     Objective     Physical Exam  Vitals reviewed.   Constitutional:       Appearance: Normal appearance.   HENT:      Head: Normocephalic.   Eyes:      Conjunctiva/sclera: Conjunctivae normal.   Cardiovascular:      Rate and Rhythm: Normal rate and regular rhythm.      Pulses: Normal pulses.      Heart sounds: No murmur heard.  Pulmonary:      Effort: Pulmonary effort is normal.      Breath sounds: Normal breath sounds.   Abdominal:      General: Bowel sounds are normal.      Palpations: Abdomen is soft.   Musculoskeletal:      Cervical back: Neck supple.      Right lower leg: No edema.      Left lower leg: No edema.   Skin:     General: Skin is warm and dry.   Neurological:      General: No focal deficit present.      Mental Status: She is alert and oriented to person, place, and time.   Psychiatric:         Mood and Affect: Mood normal.         Thought Content: Thought content normal.     /62   Pulse 84   Resp 18   Ht 1.549 m (5' 1\")   Wt 59.9 kg (132 lb)  "  LMP  (LMP Unknown)   SpO2 97%   BMI 24.94 kg/m²     Assessment/Plan     Problem List Items Addressed This Visit       OA (osteoarthritis)    Relevant Medications    diclofenac sodium (Voltaren) 1 % gel    Chronic deep vein thrombosis (DVT) of left peroneal vein (Multi) - Primary    Overview     Dx 1/2023 at [facility] ED. Unprovoked. Treated w/ Eliquis, which was stopped 5/2024. However, resumed treatment w/ Eliquis on 10/21/2024 when she had concern for a recurrent DVT in the same area. Will continue it indefinitely.          Current Assessment & Plan     Reviewed results of venous duplex scan of LLE from 10/21/2024.   Patient's ultrasound did reveal an age-indeterminate peroneal vein DVT in addition to some chronic looking DVTs. I suspect these were all due to her known prior DVT since she had no new symptoms except for pain in the area of the peroneal vein. She does have arthritic pain in her left foot and walks on the outside portion of her foot, which is evident by the wear pattern of the shoes. That is likely the cause for the pain in the area.  With that being said, given that her initial DVT in 1/2023 was unprovoked and pt has had no falls, will continue Eliquis 5 mg BID indefinitely.          Relevant Medications    apixaban (Eliquis) 5 mg tablet (Start on 12/1/2024)

## 2025-05-05 LAB
ALBUMIN SERPL-MCNC: 4.3 G/DL (ref 3.6–5.1)
ALP SERPL-CCNC: 69 U/L (ref 37–153)
ALT SERPL-CCNC: 11 U/L (ref 6–29)
ANION GAP SERPL CALCULATED.4IONS-SCNC: 11 MMOL/L (CALC) (ref 7–17)
AST SERPL-CCNC: 15 U/L (ref 10–35)
BILIRUB SERPL-MCNC: 0.7 MG/DL (ref 0.2–1.2)
BUN SERPL-MCNC: 16 MG/DL (ref 7–25)
CALCIUM SERPL-MCNC: 9.7 MG/DL (ref 8.6–10.4)
CHLORIDE SERPL-SCNC: 101 MMOL/L (ref 98–110)
CHOLEST SERPL-MCNC: 199 MG/DL
CHOLEST/HDLC SERPL: 3.9 (CALC)
CO2 SERPL-SCNC: 29 MMOL/L (ref 20–32)
CREAT SERPL-MCNC: 0.7 MG/DL (ref 0.6–0.95)
EGFRCR SERPLBLD CKD-EPI 2021: 84 ML/MIN/1.73M2
ERYTHROCYTE [DISTWIDTH] IN BLOOD BY AUTOMATED COUNT: 12.3 % (ref 11–15)
GLUCOSE SERPL-MCNC: 87 MG/DL (ref 65–139)
HCT VFR BLD AUTO: 47.6 % (ref 35–45)
HDLC SERPL-MCNC: 51 MG/DL
HGB BLD-MCNC: 15.8 G/DL (ref 11.7–15.5)
LDLC SERPL CALC-MCNC: 123 MG/DL (CALC)
MCH RBC QN AUTO: 30.6 PG (ref 27–33)
MCHC RBC AUTO-ENTMCNC: 33.2 G/DL (ref 32–36)
MCV RBC AUTO: 92.2 FL (ref 80–100)
NONHDLC SERPL-MCNC: 148 MG/DL (CALC)
PLATELET # BLD AUTO: 266 THOUSAND/UL (ref 140–400)
PMV BLD REES-ECKER: 10.3 FL (ref 7.5–12.5)
POTASSIUM SERPL-SCNC: 3.9 MMOL/L (ref 3.5–5.3)
PROT SERPL-MCNC: 7.1 G/DL (ref 6.1–8.1)
RBC # BLD AUTO: 5.16 MILLION/UL (ref 3.8–5.1)
SODIUM SERPL-SCNC: 141 MMOL/L (ref 135–146)
TRIGL SERPL-MCNC: 132 MG/DL
TSH SERPL-ACNC: 1.42 MIU/L (ref 0.4–4.5)
WBC # BLD AUTO: 7.1 THOUSAND/UL (ref 3.8–10.8)

## 2025-05-14 ENCOUNTER — HOSPITAL ENCOUNTER (OUTPATIENT)
Dept: RADIOLOGY | Facility: CLINIC | Age: 85
Discharge: HOME | End: 2025-05-14
Payer: MEDICARE

## 2025-05-14 ENCOUNTER — APPOINTMENT (OUTPATIENT)
Dept: PRIMARY CARE | Facility: CLINIC | Age: 85
End: 2025-05-14
Payer: MEDICARE

## 2025-05-14 VITALS
WEIGHT: 126 LBS | BODY MASS INDEX: 23.79 KG/M2 | OXYGEN SATURATION: 96 % | HEIGHT: 61 IN | RESPIRATION RATE: 17 BRPM | TEMPERATURE: 97.3 F | DIASTOLIC BLOOD PRESSURE: 68 MMHG | HEART RATE: 80 BPM | SYSTOLIC BLOOD PRESSURE: 108 MMHG

## 2025-05-14 DIAGNOSIS — I70.219 ATHEROSCLEROTIC PERIPHERAL VASCULAR DISEASE WITH INTERMITTENT CLAUDICATION: ICD-10-CM

## 2025-05-14 DIAGNOSIS — E78.1 PURE HYPERTRIGLYCERIDEMIA: ICD-10-CM

## 2025-05-14 DIAGNOSIS — G25.0 ESSENTIAL TREMOR: ICD-10-CM

## 2025-05-14 DIAGNOSIS — I82.552 CHRONIC DEEP VEIN THROMBOSIS (DVT) OF LEFT PERONEAL VEIN (MULTI): ICD-10-CM

## 2025-05-14 DIAGNOSIS — M89.8X1 PAIN OF RIGHT CLAVICLE: ICD-10-CM

## 2025-05-14 DIAGNOSIS — M43.17 ACQUIRED SPONDYLOLISTHESIS OF LUMBOSACRAL REGION: ICD-10-CM

## 2025-05-14 DIAGNOSIS — Z71.89 GOALS OF CARE, COUNSELING/DISCUSSION: ICD-10-CM

## 2025-05-14 DIAGNOSIS — Z00.00 ROUTINE GENERAL MEDICAL EXAMINATION AT HEALTH CARE FACILITY: Primary | ICD-10-CM

## 2025-05-14 DIAGNOSIS — M81.0 AGE-RELATED OSTEOPOROSIS WITHOUT CURRENT PATHOLOGICAL FRACTURE: ICD-10-CM

## 2025-05-14 DIAGNOSIS — Z78.0 ASYMPTOMATIC MENOPAUSAL STATE: ICD-10-CM

## 2025-05-14 PROCEDURE — 73000 X-RAY EXAM OF COLLAR BONE: CPT | Mod: RIGHT SIDE | Performed by: RADIOLOGY

## 2025-05-14 PROCEDURE — 73000 X-RAY EXAM OF COLLAR BONE: CPT | Mod: RT

## 2025-05-14 PROCEDURE — G0439 PPPS, SUBSEQ VISIT: HCPCS | Performed by: NURSE PRACTITIONER

## 2025-05-14 RX ORDER — ALENDRONATE SODIUM 70 MG/1
70 TABLET ORAL
Qty: 13 TABLET | Refills: 3 | Status: SHIPPED | OUTPATIENT
Start: 2025-05-14 | End: 2026-05-14

## 2025-05-14 ASSESSMENT — ACTIVITIES OF DAILY LIVING (ADL)
DRESSING: INDEPENDENT
GROCERY_SHOPPING: INDEPENDENT
MANAGING_FINANCES: INDEPENDENT
DOING_HOUSEWORK: INDEPENDENT
TAKING_MEDICATION: INDEPENDENT
BATHING: INDEPENDENT

## 2025-05-14 ASSESSMENT — PATIENT HEALTH QUESTIONNAIRE - PHQ9
2. FEELING DOWN, DEPRESSED OR HOPELESS: NOT AT ALL
SUM OF ALL RESPONSES TO PHQ9 QUESTIONS 1 AND 2: 0
1. LITTLE INTEREST OR PLEASURE IN DOING THINGS: NOT AT ALL

## 2025-05-14 NOTE — PROGRESS NOTES
"Subjective   Reason for Visit: Amber Carrington is an 84 y.o. female here for a Medicare Wellness visit.     Past Medical, Surgical, and Family History reviewed and updated in chart.    Reviewed all medications by prescribing practitioner or clinical pharmacist (such as prescriptions, OTCs, herbal therapies and supplements) and documented in the medical record.    HPI  Pt declines Tdap update and Shingles vaccine today.  Declines further mammograms d/t age.  Patient Care Team:  BABATUNDE Elam-CNP as PCP - General (Gerontology)     Review of Systems    Objective   Vitals:  /68   Pulse 80   Temp 36.3 °C (97.3 °F)   Resp 17   Ht 1.549 m (5' 1\")   Wt 57.2 kg (126 lb)   LMP  (LMP Unknown)   SpO2 96%   BMI 23.81 kg/m²       Physical Exam    Assessment & Plan  Pure hypertriglyceridemia  Pt was not fasting during this blood draw. This is adequately controlled with diet and exercise.          Goals of care, counseling/discussion  Reviewed goals of care with patient.  She does have a living will on file.  She remains a full code but is clear in her living will that if she is in a terminal state she would not want to be kept alive by artificial means if that is the position of her attending physician that she is in a that if the doctor's terminal state.  Her son Terry is her healthcare agent and  her daughter Samara Portillo is also Healthcare agent.         Atherosclerotic peripheral vascular disease with intermittent claudication         Chronic deep vein thrombosis (DVT) of left peroneal vein (Multi)  Continue Eliquis       Age-related osteoporosis without current pathological fracture  Continue Vitamin D-calcium supplement. Pt will repeat DEXA in 6 months.  Orders:    alendronate (Fosamax) 70 mg tablet; Take 1 tablet (70 mg) by mouth every 7 days. Take in the morning with a full glass of water, on an empty stomach, and do not take anything else by mouth or lie down for the next 30 min.    Acquired " spondylolisthesis of lumbosacral region  Has had 3 back surgeries. Chronic pain, but patient states she does not take any medication. Does not like taking any medicines. States she can manages.       Pain of right clavicle    Orders:    XR clavicle right; Future    Routine general medical examination at health care facility    Orders:    1 Year Follow Up In [facility] - PCP - Wellness Exam; Future    Asymptomatic menopausal state    Orders:    XR DEXA bone density; Future

## 2025-05-14 NOTE — ASSESSMENT & PLAN NOTE
Pt was not fasting during this blood draw. This is adequately controlled with diet and exercise.

## 2025-05-14 NOTE — ASSESSMENT & PLAN NOTE
Reviewed goals of care with patient.  She does have a living will on file.  She remains a full code but is clear in her living will that if she is in a terminal state she would not want to be kept alive by artificial means if that is the position of her attending physician that she is in a that if the doctor's terminal state.  Her son Terry is her healthcare agent and  her daughter Samara Portillo is also Healthcare agent.

## 2025-05-14 NOTE — ASSESSMENT & PLAN NOTE
Has had 3 back surgeries. Chronic pain, but patient states she does not take any medication. Does not like taking any medicines. States she can manages.

## 2025-05-14 NOTE — ASSESSMENT & PLAN NOTE
Continue Vitamin D-calcium supplement. Pt will repeat DEXA in 6 months.  Orders:    alendronate (Fosamax) 70 mg tablet; Take 1 tablet (70 mg) by mouth every 7 days. Take in the morning with a full glass of water, on an empty stomach, and do not take anything else by mouth or lie down for the next 30 min.

## 2025-05-18 PROBLEM — M25.512 ARTHRALGIA OF LEFT ACROMIOCLAVICULAR JOINT: Status: ACTIVE | Noted: 2025-05-18

## 2025-05-18 PROBLEM — N28.1 KIDNEY CYST, ACQUIRED: Status: ACTIVE | Noted: 2025-05-18

## 2025-05-18 PROBLEM — N39.0 RECURRENT UTI: Status: ACTIVE | Noted: 2025-05-18

## 2025-05-18 PROBLEM — N20.0 CALCULUS OF RIGHT KIDNEY: Status: ACTIVE | Noted: 2025-05-18

## 2025-06-04 DIAGNOSIS — I82.552 CHRONIC DEEP VEIN THROMBOSIS (DVT) OF LEFT PERONEAL VEIN (MULTI): ICD-10-CM

## 2025-06-04 RX ORDER — APIXABAN 5 MG/1
TABLET, FILM COATED ORAL
Qty: 60 TABLET | Refills: 5 | Status: SHIPPED | OUTPATIENT
Start: 2025-06-04

## 2025-10-16 ENCOUNTER — APPOINTMENT (OUTPATIENT)
Dept: OPHTHALMOLOGY | Facility: CLINIC | Age: 85
End: 2025-10-16
Payer: MEDICARE

## 2025-11-03 ENCOUNTER — APPOINTMENT (OUTPATIENT)
Dept: RADIOLOGY | Facility: CLINIC | Age: 85
End: 2025-11-03
Payer: MEDICARE

## 2026-05-14 ENCOUNTER — APPOINTMENT (OUTPATIENT)
Dept: PRIMARY CARE | Facility: CLINIC | Age: 86
End: 2026-05-14
Payer: MEDICARE

## 2026-05-15 ENCOUNTER — APPOINTMENT (OUTPATIENT)
Dept: PRIMARY CARE | Facility: CLINIC | Age: 86
End: 2026-05-15
Payer: MEDICARE

## (undated) DEVICE — GOWN,AURORA,NONREINFORCED,LARGE: Brand: MEDLINE

## (undated) DEVICE — APPLICATOR MEDICATED 26 CC SOLUTION HI LT ORNG CHLORAPREP

## (undated) DEVICE — COVER MICSCP W46XL120IN 4 BINOC GLS LENS LEICA

## (undated) DEVICE — 1010 S-DRAPE TOWEL DRAPE 10/BX: Brand: STERI-DRAPE™

## (undated) DEVICE — SUTURE VCRL SZ 3-0 L18IN ABSRB UD PS-2 L19MM 3/8 CRV PRIM J497H

## (undated) DEVICE — KAIRISON TUBING SET PNEUMATIC, (3000 MM), STERILE, DISPOSABLE, TO BE USED WITH: FK898R, PACKAGE OF 10 PIECES: Brand: KAIRISON

## (undated) DEVICE — GLOVE ORANGE PI 7 1/2   MSG9075

## (undated) DEVICE — ELECTRODE PT RET AD L9FT HI MOIST COND ADH HYDRGEL CORDED

## (undated) DEVICE — LIQUIBAND RAPID ADHESIVE 36/CS 0.8ML: Brand: MEDLINE

## (undated) DEVICE — SPONGE GZ W4XL4IN RAYON POLY CVR W/NONWOVEN FAB STRL 2/PK

## (undated) DEVICE — SUTURE VCRL SZ 2-0 L27IN ABSRB UD L36MM CP-1 1/2 CIR REV J266H

## (undated) DEVICE — DRAIN SURG 10FR 100% SIL RND END PERF W/ TRCR

## (undated) DEVICE — SINGLE PORT MANIFOLD: Brand: NEPTUNE 2

## (undated) DEVICE — FLOSEAL WITH RECOTHROM - 10ML.: Brand: FLOSEAL HEMOSTATIC MATRIX

## (undated) DEVICE — BLADE,CARBON-STEEL,11,STRL,DISPOSABLE,TB: Brand: MEDLINE

## (undated) DEVICE — LABEL MED MINI W/ MARKER

## (undated) DEVICE — KIT EVAC 400CC PVC RADPQ Y CONN

## (undated) DEVICE — SUTURE VCRL SZ 4-0 L18IN ABSRB UD L19MM PS-2 3/8 CIR PRIM J496H

## (undated) DEVICE — CARBIDE MATCH HEAD

## (undated) DEVICE — SYRINGE MED 30ML STD CLR PLAS LUERLOCK TIP N CTRL DISP

## (undated) DEVICE — APPLICATOR MEDICATED 10.5 CC SOLUTION HI LT ORNG CHLORAPREP

## (undated) DEVICE — GLOVE ORANGE PI 8   MSG9080

## (undated) DEVICE — SUTURE VCRL SZ 0 L27IN ABSRB UD L26MM CP-2 1/2 CIR SGL J870H

## (undated) DEVICE — NEURO PACK: Brand: MEDLINE INDUSTRIES, INC.